# Patient Record
Sex: MALE | Race: WHITE | Employment: FULL TIME | URBAN - METROPOLITAN AREA
[De-identification: names, ages, dates, MRNs, and addresses within clinical notes are randomized per-mention and may not be internally consistent; named-entity substitution may affect disease eponyms.]

---

## 2018-06-30 ENCOUNTER — APPOINTMENT (OUTPATIENT)
Dept: CT IMAGING | Age: 23
End: 2018-06-30
Payer: COMMERCIAL

## 2018-06-30 ENCOUNTER — APPOINTMENT (OUTPATIENT)
Dept: GENERAL RADIOLOGY | Age: 23
End: 2018-06-30
Payer: COMMERCIAL

## 2018-06-30 ENCOUNTER — HOSPITAL ENCOUNTER (EMERGENCY)
Age: 23
Discharge: HOME | End: 2018-07-01
Attending: EMERGENCY MEDICINE
Payer: COMMERCIAL

## 2018-06-30 ENCOUNTER — APPOINTMENT (OUTPATIENT)
Dept: ULTRASOUND IMAGING | Age: 23
End: 2018-06-30
Payer: COMMERCIAL

## 2018-06-30 ENCOUNTER — NURSE TRIAGE (OUTPATIENT)
Dept: OTHER | Facility: CLINIC | Age: 23
End: 2018-06-30

## 2018-06-30 DIAGNOSIS — M54.30 SCIATICA, UNSPECIFIED LATERALITY: Primary | ICD-10-CM

## 2018-06-30 DIAGNOSIS — R55 NEAR SYNCOPE: ICD-10-CM

## 2018-06-30 LAB
ANION GAP SERPL CALCULATED.3IONS-SCNC: 12 MMOL/L (ref 7–16)
BUN BLDV-MCNC: 7 MG/DL (ref 6–20)
CALCIUM SERPL-MCNC: 8.4 MG/DL (ref 8.6–10.2)
CHLORIDE BLD-SCNC: 96 MMOL/L (ref 98–107)
CO2: 26 MMOL/L (ref 22–29)
CREAT SERPL-MCNC: 1 MG/DL (ref 0.7–1.2)
D DIMER: 2728 NG/ML DDU
EKG ATRIAL RATE: 100 BPM
EKG P AXIS: 37 DEGREES
EKG P-R INTERVAL: 128 MS
EKG Q-T INTERVAL: 348 MS
EKG QRS DURATION: 90 MS
EKG QTC CALCULATION (BAZETT): 448 MS
EKG R AXIS: 28 DEGREES
EKG T AXIS: 28 DEGREES
EKG VENTRICULAR RATE: 100 BPM
GFR AFRICAN AMERICAN: >60
GFR NON-AFRICAN AMERICAN: >60 ML/MIN/1.73
GLUCOSE BLD-MCNC: 98 MG/DL (ref 74–109)
HCT VFR BLD CALC: 43.9 % (ref 37–54)
HEMOGLOBIN: 14.8 G/DL (ref 12.5–16.5)
MCH RBC QN AUTO: 30.3 PG (ref 26–35)
MCHC RBC AUTO-ENTMCNC: 33.7 % (ref 32–34.5)
MCV RBC AUTO: 89.8 FL (ref 80–99.9)
PDW BLD-RTO: 12.8 FL (ref 11.5–15)
PLATELET # BLD: 188 E9/L (ref 130–450)
PMV BLD AUTO: 11.1 FL (ref 7–12)
POTASSIUM SERPL-SCNC: 3.3 MMOL/L (ref 3.5–5)
RBC # BLD: 4.89 E12/L (ref 3.8–5.8)
SODIUM BLD-SCNC: 134 MMOL/L (ref 132–146)
TROPONIN: <0.01 NG/ML (ref 0–0.03)
WBC # BLD: 10.7 E9/L (ref 4.5–11.5)

## 2018-06-30 PROCEDURE — 72100 X-RAY EXAM L-S SPINE 2/3 VWS: CPT

## 2018-06-30 PROCEDURE — 99284 EMERGENCY DEPT VISIT MOD MDM: CPT

## 2018-06-30 PROCEDURE — 93971 EXTREMITY STUDY: CPT

## 2018-06-30 PROCEDURE — 85378 FIBRIN DEGRADE SEMIQUANT: CPT

## 2018-06-30 PROCEDURE — 6360000004 HC RX CONTRAST MEDICATION: Performed by: RADIOLOGY

## 2018-06-30 PROCEDURE — 85027 COMPLETE CBC AUTOMATED: CPT

## 2018-06-30 PROCEDURE — 80048 BASIC METABOLIC PNL TOTAL CA: CPT

## 2018-06-30 PROCEDURE — 6360000002 HC RX W HCPCS: Performed by: PHYSICIAN ASSISTANT

## 2018-06-30 PROCEDURE — 2580000003 HC RX 258: Performed by: PHYSICIAN ASSISTANT

## 2018-06-30 PROCEDURE — 6370000000 HC RX 637 (ALT 250 FOR IP): Performed by: PHYSICIAN ASSISTANT

## 2018-06-30 PROCEDURE — 72040 X-RAY EXAM NECK SPINE 2-3 VW: CPT

## 2018-06-30 PROCEDURE — 94760 N-INVAS EAR/PLS OXIMETRY 1: CPT

## 2018-06-30 PROCEDURE — 36415 COLL VENOUS BLD VENIPUNCTURE: CPT

## 2018-06-30 PROCEDURE — 71260 CT THORAX DX C+: CPT

## 2018-06-30 PROCEDURE — 96372 THER/PROPH/DIAG INJ SC/IM: CPT

## 2018-06-30 PROCEDURE — 84484 ASSAY OF TROPONIN QUANT: CPT

## 2018-06-30 RX ORDER — DEXAMETHASONE SODIUM PHOSPHATE 10 MG/ML
10 INJECTION INTRAMUSCULAR; INTRAVENOUS ONCE
Status: COMPLETED | OUTPATIENT
Start: 2018-06-30 | End: 2018-06-30

## 2018-06-30 RX ORDER — KETOROLAC TROMETHAMINE 30 MG/ML
30 INJECTION, SOLUTION INTRAMUSCULAR; INTRAVENOUS ONCE
Status: COMPLETED | OUTPATIENT
Start: 2018-06-30 | End: 2018-06-30

## 2018-06-30 RX ORDER — PREDNISONE 10 MG/1
40 TABLET ORAL DAILY
Qty: 20 TABLET | Refills: 0 | Status: SHIPPED | OUTPATIENT
Start: 2018-06-30 | End: 2018-07-05

## 2018-06-30 RX ORDER — 0.9 % SODIUM CHLORIDE 0.9 %
1000 INTRAVENOUS SOLUTION INTRAVENOUS ONCE
Status: COMPLETED | OUTPATIENT
Start: 2018-06-30 | End: 2018-06-30

## 2018-06-30 RX ORDER — NAPROXEN 500 MG/1
500 TABLET ORAL 2 TIMES DAILY
Qty: 14 TABLET | Refills: 0 | Status: SHIPPED | OUTPATIENT
Start: 2018-06-30 | End: 2019-02-12

## 2018-06-30 RX ORDER — POTASSIUM CHLORIDE 20 MEQ/1
40 TABLET, EXTENDED RELEASE ORAL ONCE
Status: COMPLETED | OUTPATIENT
Start: 2018-06-30 | End: 2018-06-30

## 2018-06-30 RX ADMIN — POTASSIUM CHLORIDE 40 MEQ: 20 TABLET, EXTENDED RELEASE ORAL at 21:49

## 2018-06-30 RX ADMIN — IOPAMIDOL 70 ML: 755 INJECTION, SOLUTION INTRAVENOUS at 23:00

## 2018-06-30 RX ADMIN — DEXAMETHASONE SODIUM PHOSPHATE 10 MG: 10 INJECTION INTRAMUSCULAR; INTRAVENOUS at 20:14

## 2018-06-30 RX ADMIN — KETOROLAC TROMETHAMINE 30 MG: 30 INJECTION, SOLUTION INTRAMUSCULAR at 20:13

## 2018-06-30 RX ADMIN — SODIUM CHLORIDE 1000 ML: 9 INJECTION, SOLUTION INTRAVENOUS at 21:00

## 2018-06-30 ASSESSMENT — PAIN DESCRIPTION - DESCRIPTORS
DESCRIPTORS: SHARP;STABBING
DESCRIPTORS: DISCOMFORT;SHARP;STABBING

## 2018-06-30 ASSESSMENT — PAIN SCALES - GENERAL
PAINLEVEL_OUTOF10: 8

## 2018-06-30 ASSESSMENT — PAIN DESCRIPTION - FREQUENCY
FREQUENCY: CONTINUOUS
FREQUENCY: CONTINUOUS

## 2018-06-30 ASSESSMENT — PAIN DESCRIPTION - LOCATION
LOCATION: LEG
LOCATION: LEG

## 2018-06-30 ASSESSMENT — PAIN DESCRIPTION - ORIENTATION
ORIENTATION: LEFT
ORIENTATION: RIGHT

## 2018-06-30 ASSESSMENT — PAIN DESCRIPTION - PAIN TYPE
TYPE: ACUTE PAIN
TYPE: ACUTE PAIN

## 2018-06-30 NOTE — ED PROVIDER NOTES
ED Attending  CC: No  HPI:  6/30/18, Time: 7:36 PM         Josie Lee is a 20 y.o. male presenting to the ED for left hip / leg pain  beginning 3 days  ago. The complaint has been persistent, moderate in severity, and worsened by standing, sitting, walking. Patient comes in with complaint of left leg pain numbness. He also states he's had intermittent right arm pain with numbness. Improvement denies any known injury. He states he has had intermittent neck and lower back pain. Denies any abdominal pain no urinary or bowel incontinence no saddle paresthesias. States the pain started 3 days ago. Has concern for DVT. He states his family history of DVTs. Review of Systems:   Pertinent positives and negatives are stated within HPI, all other systems reviewed and are negative.          --------------------------------------------- PAST HISTORY ---------------------------------------------  Past Medical History:  has a past medical history of GERD (gastroesophageal reflux disease); Hyperlipidemia; Hypertension; Intracranial arachnoid cyst; and Tobacco abuse. Past Surgical History:  has a past surgical history that includes Tympanostomy tube placement. Social History:  reports that he has been smoking Cigarettes. He started smoking about 9 years ago. He has a 17.00 pack-year smoking history. He has never used smokeless tobacco. He reports that he drinks alcohol. He reports that he does not use drugs. Family History: family history includes ADHD in his brother; COPD in his maternal grandmother; Cancer in his maternal grandfather; Down Syndrome in his maternal aunt; High Blood Pressure in his maternal aunt, maternal aunt, and maternal grandmother; High Cholesterol in his maternal grandmother; Other in his father, maternal grandmother, paternal grandmother, and sister; Seizures in his paternal grandfather; Thyroid Disease in his paternal aunt and paternal uncle.      The patients home medications have mL (70 mLs Intravenous Given 6/30/18 2300)     EKG # 1  Interpreted by emergency department physician unless otherwise noted. Time:  2119    Rate: 100  Rhythm: Sinus. Interpretation: normal EKG, normal sinus rhythm. ED COURSE:   2030 Patient had a syncopal episode while getting x-rays. Patient denies any headache. Denied any chest pain palpitations shortness of breath. He did appear diaphoretic upon return to the ER. Patient denies any abdominal pain. Medical Decision Making: CT chest ultrasound lower extremity negative EKG normal sinus rhythm   Patient stated before on sciatica placed on prednisone Naprosyn to follow-up with primary care    Counseling: The emergency provider has spoken with the patient and discussed todays results, in addition to providing specific details for the plan of care and counseling regarding the diagnosis and prognosis. Questions are answered at this time and they are agreeable with the plan.      --------------------------------- IMPRESSION AND DISPOSITION ---------------------------------    IMPRESSION  1. Sciatica, unspecified laterality    2. Near syncope        DISPOSITION  Disposition: Discharge to home  Patient condition is good      NOTE: This report was transcribed using voice recognition software.  Every effort was made to ensure accuracy; however, inadvertent computerized transcription errors may be present     Camilla Wu, [id number]  07/01/18 [id number]

## 2018-07-01 VITALS
SYSTOLIC BLOOD PRESSURE: 105 MMHG | RESPIRATION RATE: 16 BRPM | WEIGHT: 200 LBS | HEART RATE: 88 BPM | DIASTOLIC BLOOD PRESSURE: 65 MMHG | TEMPERATURE: 98.6 F | OXYGEN SATURATION: 96 % | BODY MASS INDEX: 29.62 KG/M2 | HEIGHT: 69 IN

## 2018-07-02 ENCOUNTER — HOSPITAL ENCOUNTER (OUTPATIENT)
Age: 23
Discharge: HOME | End: 2018-07-04
Payer: COMMERCIAL

## 2018-07-02 ENCOUNTER — OFFICE VISIT (OUTPATIENT)
Dept: FAMILY MEDICINE CLINIC | Age: 23
End: 2018-07-02
Payer: COMMERCIAL

## 2018-07-02 VITALS
HEART RATE: 106 BPM | BODY MASS INDEX: 34.8 KG/M2 | TEMPERATURE: 98.1 F | DIASTOLIC BLOOD PRESSURE: 70 MMHG | OXYGEN SATURATION: 95 % | SYSTOLIC BLOOD PRESSURE: 90 MMHG | HEIGHT: 69 IN | RESPIRATION RATE: 18 BRPM | WEIGHT: 235 LBS

## 2018-07-02 DIAGNOSIS — R55 SYNCOPE, UNSPECIFIED SYNCOPE TYPE: Primary | ICD-10-CM

## 2018-07-02 DIAGNOSIS — R55 SYNCOPE, UNSPECIFIED SYNCOPE TYPE: ICD-10-CM

## 2018-07-02 DIAGNOSIS — E87.6 HYPOKALEMIA: ICD-10-CM

## 2018-07-02 DIAGNOSIS — G93.0 ARACHNOID CYST: ICD-10-CM

## 2018-07-02 LAB
ANION GAP SERPL CALCULATED.3IONS-SCNC: 13 MMOL/L (ref 7–16)
BUN BLDV-MCNC: 7 MG/DL (ref 6–20)
CALCIUM SERPL-MCNC: 8.2 MG/DL (ref 8.6–10.2)
CHLORIDE BLD-SCNC: 102 MMOL/L (ref 98–107)
CO2: 24 MMOL/L (ref 22–29)
CREAT SERPL-MCNC: 0.9 MG/DL (ref 0.7–1.2)
GFR AFRICAN AMERICAN: >60
GFR NON-AFRICAN AMERICAN: >60 ML/MIN/1.73
GLUCOSE BLD-MCNC: 85 MG/DL (ref 74–109)
POTASSIUM SERPL-SCNC: 3.9 MMOL/L (ref 3.5–5)
SODIUM BLD-SCNC: 139 MMOL/L (ref 132–146)

## 2018-07-02 PROCEDURE — 80048 BASIC METABOLIC PNL TOTAL CA: CPT

## 2018-07-02 PROCEDURE — 99214 OFFICE O/P EST MOD 30 MIN: CPT | Performed by: FAMILY MEDICINE

## 2018-07-02 PROCEDURE — 36415 COLL VENOUS BLD VENIPUNCTURE: CPT | Performed by: FAMILY MEDICINE

## 2018-07-02 ASSESSMENT — ENCOUNTER SYMPTOMS
SINUS PRESSURE: 0
WHEEZING: 0
COLOR CHANGE: 0
CHEST TIGHTNESS: 0
ALLERGIC/IMMUNOLOGIC NEGATIVE: 1
SHORTNESS OF BREATH: 0
SORE THROAT: 0
COUGH: 0
DIARRHEA: 0
APNEA: 0
NAUSEA: 0
BLOOD IN STOOL: 0
VOMITING: 0
PHOTOPHOBIA: 0
FACIAL SWELLING: 0

## 2018-07-02 NOTE — PROGRESS NOTES
DO    Hypokalemia  -     BASIC METABOLIC PANEL;  Future    Arachnoid cyst  -     CT Head WO Contrast; Future            Marzena Kilgore DO    7/2/2018  11:43 AM

## 2018-07-09 ENCOUNTER — HOSPITAL ENCOUNTER (OUTPATIENT)
Dept: CT IMAGING | Age: 23
Discharge: HOME | End: 2018-07-11
Payer: COMMERCIAL

## 2018-07-09 DIAGNOSIS — R55 SYNCOPE, UNSPECIFIED SYNCOPE TYPE: ICD-10-CM

## 2018-07-09 DIAGNOSIS — G93.0 ARACHNOID CYST: ICD-10-CM

## 2018-07-09 PROCEDURE — 70450 CT HEAD/BRAIN W/O DYE: CPT

## 2018-07-19 ENCOUNTER — TELEPHONE (OUTPATIENT)
Dept: FAMILY MEDICINE CLINIC | Age: 23
End: 2018-07-19

## 2018-07-19 DIAGNOSIS — F41.8 ANXIETY WITH DEPRESSION: Primary | ICD-10-CM

## 2019-02-12 ENCOUNTER — OFFICE VISIT (OUTPATIENT)
Dept: FAMILY MEDICINE CLINIC | Age: 24
End: 2019-02-12
Payer: COMMERCIAL

## 2019-02-12 ENCOUNTER — HOSPITAL ENCOUNTER (OUTPATIENT)
Age: 24
Discharge: HOME | End: 2019-02-14
Payer: COMMERCIAL

## 2019-02-12 VITALS
HEIGHT: 69 IN | SYSTOLIC BLOOD PRESSURE: 130 MMHG | BODY MASS INDEX: 37.03 KG/M2 | OXYGEN SATURATION: 97 % | HEART RATE: 70 BPM | RESPIRATION RATE: 18 BRPM | DIASTOLIC BLOOD PRESSURE: 88 MMHG | TEMPERATURE: 98.2 F | WEIGHT: 250 LBS

## 2019-02-12 DIAGNOSIS — R10.13 EPIGASTRIC PAIN: ICD-10-CM

## 2019-02-12 DIAGNOSIS — Z23 NEED FOR PROPHYLACTIC VACCINATION AGAINST STREPTOCOCCUS PNEUMONIAE (PNEUMOCOCCUS): ICD-10-CM

## 2019-02-12 DIAGNOSIS — Z00.00 WELL ADULT EXAM: Primary | ICD-10-CM

## 2019-02-12 LAB
ALBUMIN SERPL-MCNC: 4.4 G/DL (ref 3.5–5.2)
ALP BLD-CCNC: 54 U/L (ref 40–129)
ALT SERPL-CCNC: 33 U/L (ref 0–40)
AMYLASE: 17 U/L (ref 20–100)
ANION GAP SERPL CALCULATED.3IONS-SCNC: 11 MMOL/L (ref 7–16)
AST SERPL-CCNC: 17 U/L (ref 0–39)
BASOPHILS ABSOLUTE: 0.03 E9/L (ref 0–0.2)
BASOPHILS RELATIVE PERCENT: 0.5 % (ref 0–2)
BILIRUB SERPL-MCNC: 0.4 MG/DL (ref 0–1.2)
BUN BLDV-MCNC: 9 MG/DL (ref 6–20)
CALCIUM SERPL-MCNC: 9.1 MG/DL (ref 8.6–10.2)
CHLORIDE BLD-SCNC: 103 MMOL/L (ref 98–107)
CO2: 25 MMOL/L (ref 22–29)
CREAT SERPL-MCNC: 0.9 MG/DL (ref 0.7–1.2)
EOSINOPHILS ABSOLUTE: 0.21 E9/L (ref 0.05–0.5)
EOSINOPHILS RELATIVE PERCENT: 3.2 % (ref 0–6)
GFR AFRICAN AMERICAN: >60
GFR NON-AFRICAN AMERICAN: >60 ML/MIN/1.73
GLUCOSE BLD-MCNC: 88 MG/DL (ref 74–99)
HCT VFR BLD CALC: 48.2 % (ref 37–54)
HEMOGLOBIN: 15.8 G/DL (ref 12.5–16.5)
IMMATURE GRANULOCYTES #: 0.02 E9/L
IMMATURE GRANULOCYTES %: 0.3 % (ref 0–5)
LIPASE: 23 U/L (ref 13–60)
LYMPHOCYTES ABSOLUTE: 2.05 E9/L (ref 1.5–4)
LYMPHOCYTES RELATIVE PERCENT: 31.6 % (ref 20–42)
MCH RBC QN AUTO: 30.8 PG (ref 26–35)
MCHC RBC AUTO-ENTMCNC: 32.8 % (ref 32–34.5)
MCV RBC AUTO: 94 FL (ref 80–99.9)
MONOCYTES ABSOLUTE: 0.53 E9/L (ref 0.1–0.95)
MONOCYTES RELATIVE PERCENT: 8.2 % (ref 2–12)
NEUTROPHILS ABSOLUTE: 3.65 E9/L (ref 1.8–7.3)
NEUTROPHILS RELATIVE PERCENT: 56.2 % (ref 43–80)
PDW BLD-RTO: 12.1 FL (ref 11.5–15)
PLATELET # BLD: 278 E9/L (ref 130–450)
PMV BLD AUTO: 11.2 FL (ref 7–12)
POTASSIUM SERPL-SCNC: 4.3 MMOL/L (ref 3.5–5)
RBC # BLD: 5.13 E12/L (ref 3.8–5.8)
SODIUM BLD-SCNC: 139 MMOL/L (ref 132–146)
TOTAL PROTEIN: 7.2 G/DL (ref 6.4–8.3)
WBC # BLD: 6.5 E9/L (ref 4.5–11.5)

## 2019-02-12 PROCEDURE — 86677 HELICOBACTER PYLORI ANTIBODY: CPT

## 2019-02-12 PROCEDURE — 99395 PREV VISIT EST AGE 18-39: CPT | Performed by: FAMILY MEDICINE

## 2019-02-12 PROCEDURE — 83690 ASSAY OF LIPASE: CPT

## 2019-02-12 PROCEDURE — 82150 ASSAY OF AMYLASE: CPT

## 2019-02-12 PROCEDURE — 80053 COMPREHEN METABOLIC PANEL: CPT

## 2019-02-12 PROCEDURE — 90732 PPSV23 VACC 2 YRS+ SUBQ/IM: CPT | Performed by: FAMILY MEDICINE

## 2019-02-12 PROCEDURE — 85025 COMPLETE CBC W/AUTO DIFF WBC: CPT

## 2019-02-12 PROCEDURE — 90471 IMMUNIZATION ADMIN: CPT | Performed by: FAMILY MEDICINE

## 2019-02-12 PROCEDURE — 36415 COLL VENOUS BLD VENIPUNCTURE: CPT | Performed by: FAMILY MEDICINE

## 2019-02-12 RX ORDER — PANTOPRAZOLE SODIUM 40 MG/1
40 TABLET, DELAYED RELEASE ORAL
Qty: 30 TABLET | Refills: 5 | Status: SHIPPED | OUTPATIENT
Start: 2019-02-12 | End: 2020-01-01

## 2019-02-12 ASSESSMENT — PATIENT HEALTH QUESTIONNAIRE - PHQ9
SUM OF ALL RESPONSES TO PHQ QUESTIONS 1-9: 2
SUM OF ALL RESPONSES TO PHQ QUESTIONS 1-9: 2
1. LITTLE INTEREST OR PLEASURE IN DOING THINGS: 0
2. FEELING DOWN, DEPRESSED OR HOPELESS: 2
SUM OF ALL RESPONSES TO PHQ9 QUESTIONS 1 & 2: 2

## 2019-02-12 ASSESSMENT — ENCOUNTER SYMPTOMS
SHORTNESS OF BREATH: 0
PHOTOPHOBIA: 0
VOMITING: 0
APNEA: 0
WHEEZING: 0
BLOOD IN STOOL: 0
COLOR CHANGE: 0
ABDOMINAL PAIN: 1
ALLERGIC/IMMUNOLOGIC NEGATIVE: 1
SINUS PRESSURE: 0
CHEST TIGHTNESS: 0
COUGH: 0
SORE THROAT: 0
BACK PAIN: 0
FACIAL SWELLING: 0
NAUSEA: 0
DIARRHEA: 0

## 2019-02-14 ENCOUNTER — TELEPHONE (OUTPATIENT)
Dept: SURGERY | Age: 24
End: 2019-02-14

## 2019-02-14 LAB — HELICOBACTER PYLORI IGG: NORMAL

## 2019-02-20 ENCOUNTER — TELEPHONE (OUTPATIENT)
Dept: SURGERY | Age: 24
End: 2019-02-20

## 2019-03-04 ENCOUNTER — TELEPHONE (OUTPATIENT)
Dept: SURGERY | Age: 24
End: 2019-03-04

## 2019-03-11 ENCOUNTER — TELEPHONE (OUTPATIENT)
Dept: ADMINISTRATIVE | Age: 24
End: 2019-03-11

## 2019-03-20 ENCOUNTER — OFFICE VISIT (OUTPATIENT)
Dept: SURGERY | Age: 24
End: 2019-03-20
Payer: COMMERCIAL

## 2019-03-20 VITALS
RESPIRATION RATE: 16 BRPM | HEART RATE: 109 BPM | DIASTOLIC BLOOD PRESSURE: 83 MMHG | BODY MASS INDEX: 37.89 KG/M2 | SYSTOLIC BLOOD PRESSURE: 132 MMHG | HEIGHT: 68 IN | WEIGHT: 250 LBS

## 2019-03-20 DIAGNOSIS — R10.11 RUQ PAIN: Primary | ICD-10-CM

## 2019-03-20 DIAGNOSIS — R10.13 EPIGASTRIC PAIN: ICD-10-CM

## 2019-03-20 DIAGNOSIS — R11.2 NAUSEA AND VOMITING, INTRACTABILITY OF VOMITING NOT SPECIFIED, UNSPECIFIED VOMITING TYPE: ICD-10-CM

## 2019-03-20 PROCEDURE — 99244 OFF/OP CNSLTJ NEW/EST MOD 40: CPT | Performed by: SURGERY

## 2019-03-23 ENCOUNTER — PREP FOR PROCEDURE (OUTPATIENT)
Dept: SURGERY | Age: 24
End: 2019-03-23

## 2019-03-23 RX ORDER — 0.9 % SODIUM CHLORIDE 0.9 %
10 VIAL (ML) INJECTION EVERY 12 HOURS SCHEDULED
Status: CANCELLED | OUTPATIENT
Start: 2019-03-23

## 2019-03-23 RX ORDER — SODIUM CHLORIDE 9 MG/ML
INJECTION, SOLUTION INTRAVENOUS CONTINUOUS
Status: CANCELLED | OUTPATIENT
Start: 2019-03-23

## 2019-03-23 RX ORDER — 0.9 % SODIUM CHLORIDE 0.9 %
10 VIAL (ML) INJECTION PRN
Status: CANCELLED | OUTPATIENT
Start: 2019-03-23

## 2019-03-23 ASSESSMENT — ENCOUNTER SYMPTOMS
DIARRHEA: 0
NAUSEA: 1
VOMITING: 1
SHORTNESS OF BREATH: 0
WHEEZING: 0
COUGH: 0
BACK PAIN: 0
CONSTIPATION: 0
PHOTOPHOBIA: 0
EYE REDNESS: 0
ABDOMINAL PAIN: 1
SORE THROAT: 0
BLOOD IN STOOL: 0

## 2019-03-25 ENCOUNTER — ANESTHESIA (OUTPATIENT)
Dept: ENDOSCOPY | Age: 24
End: 2019-03-25
Payer: COMMERCIAL

## 2019-03-25 ENCOUNTER — HOSPITAL ENCOUNTER (OUTPATIENT)
Age: 24
Setting detail: OUTPATIENT SURGERY
Discharge: HOME | End: 2019-03-25
Attending: SURGERY | Admitting: SURGERY
Payer: COMMERCIAL

## 2019-03-25 ENCOUNTER — ANESTHESIA EVENT (OUTPATIENT)
Dept: ENDOSCOPY | Age: 24
End: 2019-03-25
Payer: COMMERCIAL

## 2019-03-25 VITALS
HEIGHT: 69 IN | SYSTOLIC BLOOD PRESSURE: 129 MMHG | DIASTOLIC BLOOD PRESSURE: 70 MMHG | HEART RATE: 72 BPM | RESPIRATION RATE: 18 BRPM | OXYGEN SATURATION: 96 % | TEMPERATURE: 98 F | BODY MASS INDEX: 37.03 KG/M2 | WEIGHT: 250 LBS

## 2019-03-25 VITALS
RESPIRATION RATE: 16 BRPM | SYSTOLIC BLOOD PRESSURE: 117 MMHG | OXYGEN SATURATION: 95 % | DIASTOLIC BLOOD PRESSURE: 58 MMHG

## 2019-03-25 PROCEDURE — 88305 TISSUE EXAM BY PATHOLOGIST: CPT

## 2019-03-25 PROCEDURE — 7100000010 HC PHASE II RECOVERY - FIRST 15 MIN: Performed by: SURGERY

## 2019-03-25 PROCEDURE — 2580000003 HC RX 258: Performed by: SURGERY

## 2019-03-25 PROCEDURE — 6360000002 HC RX W HCPCS: Performed by: NURSE ANESTHETIST, CERTIFIED REGISTERED

## 2019-03-25 PROCEDURE — 3609012400 HC EGD TRANSORAL BIOPSY SINGLE/MULTIPLE: Performed by: SURGERY

## 2019-03-25 PROCEDURE — 2709999900 HC NON-CHARGEABLE SUPPLY: Performed by: SURGERY

## 2019-03-25 PROCEDURE — 7100000011 HC PHASE II RECOVERY - ADDTL 15 MIN: Performed by: SURGERY

## 2019-03-25 PROCEDURE — 3700000000 HC ANESTHESIA ATTENDED CARE: Performed by: SURGERY

## 2019-03-25 PROCEDURE — 43239 EGD BIOPSY SINGLE/MULTIPLE: CPT | Performed by: SURGERY

## 2019-03-25 PROCEDURE — 88342 IMHCHEM/IMCYTCHM 1ST ANTB: CPT

## 2019-03-25 RX ORDER — SODIUM CHLORIDE 0.9 % (FLUSH) 0.9 %
10 SYRINGE (ML) INJECTION PRN
Status: DISCONTINUED | OUTPATIENT
Start: 2019-03-25 | End: 2019-03-25 | Stop reason: HOSPADM

## 2019-03-25 RX ORDER — PROPOFOL 10 MG/ML
INJECTION, EMULSION INTRAVENOUS PRN
Status: DISCONTINUED | OUTPATIENT
Start: 2019-03-25 | End: 2019-03-25 | Stop reason: SDUPTHER

## 2019-03-25 RX ORDER — SODIUM CHLORIDE 0.9 % (FLUSH) 0.9 %
10 SYRINGE (ML) INJECTION EVERY 12 HOURS SCHEDULED
Status: DISCONTINUED | OUTPATIENT
Start: 2019-03-25 | End: 2019-03-25 | Stop reason: HOSPADM

## 2019-03-25 RX ORDER — SODIUM CHLORIDE 9 MG/ML
INJECTION, SOLUTION INTRAVENOUS CONTINUOUS
Status: DISCONTINUED | OUTPATIENT
Start: 2019-03-25 | End: 2019-03-25 | Stop reason: HOSPADM

## 2019-03-25 RX ADMIN — SODIUM CHLORIDE: 9 INJECTION, SOLUTION INTRAVENOUS at 12:14

## 2019-03-25 RX ADMIN — PROPOFOL 150 MG: 10 INJECTION, EMULSION INTRAVENOUS at 12:12

## 2019-03-25 RX ADMIN — SODIUM CHLORIDE: 9 INJECTION, SOLUTION INTRAVENOUS at 12:02

## 2019-03-25 ASSESSMENT — LIFESTYLE VARIABLES: SMOKING_STATUS: 1

## 2019-03-25 ASSESSMENT — ENCOUNTER SYMPTOMS: SHORTNESS OF BREATH: 0

## 2019-03-25 ASSESSMENT — PAIN - FUNCTIONAL ASSESSMENT: PAIN_FUNCTIONAL_ASSESSMENT: 0-10

## 2019-04-03 ENCOUNTER — HOSPITAL ENCOUNTER (OUTPATIENT)
Dept: NUCLEAR MEDICINE | Age: 24
Discharge: HOME | End: 2019-04-03
Payer: COMMERCIAL

## 2019-04-03 ENCOUNTER — HOSPITAL ENCOUNTER (OUTPATIENT)
Dept: ULTRASOUND IMAGING | Age: 24
Discharge: HOME | End: 2019-04-05
Payer: COMMERCIAL

## 2019-04-03 DIAGNOSIS — R10.11 RUQ PAIN: ICD-10-CM

## 2019-04-03 PROCEDURE — 76705 ECHO EXAM OF ABDOMEN: CPT

## 2019-04-10 ENCOUNTER — TELEPHONE (OUTPATIENT)
Dept: SURGERY | Age: 24
End: 2019-04-10

## 2019-04-10 ENCOUNTER — OFFICE VISIT (OUTPATIENT)
Dept: SURGERY | Age: 24
End: 2019-04-10
Payer: COMMERCIAL

## 2019-04-10 ENCOUNTER — PREP FOR PROCEDURE (OUTPATIENT)
Dept: SURGERY | Age: 24
End: 2019-04-10

## 2019-04-10 VITALS
HEART RATE: 95 BPM | HEIGHT: 68 IN | DIASTOLIC BLOOD PRESSURE: 79 MMHG | SYSTOLIC BLOOD PRESSURE: 133 MMHG | TEMPERATURE: 97.6 F | RESPIRATION RATE: 20 BRPM | OXYGEN SATURATION: 94 % | BODY MASS INDEX: 36.98 KG/M2 | WEIGHT: 244 LBS

## 2019-04-10 DIAGNOSIS — K80.20 SYMPTOMATIC CHOLELITHIASIS: ICD-10-CM

## 2019-04-10 DIAGNOSIS — R10.11 RUQ PAIN: Primary | ICD-10-CM

## 2019-04-10 DIAGNOSIS — R10.13 EPIGASTRIC PAIN: ICD-10-CM

## 2019-04-10 DIAGNOSIS — R11.2 NAUSEA AND VOMITING, INTRACTABILITY OF VOMITING NOT SPECIFIED, UNSPECIFIED VOMITING TYPE: ICD-10-CM

## 2019-04-10 PROCEDURE — 99214 OFFICE O/P EST MOD 30 MIN: CPT | Performed by: SURGERY

## 2019-04-10 RX ORDER — SODIUM CHLORIDE 0.9 % (FLUSH) 0.9 %
10 SYRINGE (ML) INJECTION PRN
Status: CANCELLED | OUTPATIENT
Start: 2019-04-10

## 2019-04-10 RX ORDER — SODIUM CHLORIDE 9 MG/ML
INJECTION, SOLUTION INTRAVENOUS CONTINUOUS
Status: CANCELLED | OUTPATIENT
Start: 2019-04-10

## 2019-04-10 RX ORDER — SODIUM CHLORIDE 0.9 % (FLUSH) 0.9 %
10 SYRINGE (ML) INJECTION EVERY 12 HOURS SCHEDULED
Status: CANCELLED | OUTPATIENT
Start: 2019-04-10

## 2019-04-10 ASSESSMENT — ENCOUNTER SYMPTOMS
CONSTIPATION: 0
BACK PAIN: 0
ABDOMINAL PAIN: 1
NAUSEA: 1
WHEEZING: 0
BLOOD IN STOOL: 0
PHOTOPHOBIA: 0
DIARRHEA: 0
COUGH: 0
EYE REDNESS: 0
SHORTNESS OF BREATH: 0
VOMITING: 1
SORE THROAT: 0

## 2019-04-10 NOTE — TELEPHONE ENCOUNTER
Scheduled patient for lap talita on 4/19/19 at 10:00am. Patient needs to report at the front entrance 2 hours before the procedure, NPO after the midnight the night before the procedure. No ASA products for 5 days. Attempted to call the patient, no answer, left message on the phone. Instruction letter mailed. Encouraged to call our office if any questions.   Electronically signed by Kiki Salamanca on 4/10/2019 at 1:47 PM

## 2019-04-10 NOTE — PROGRESS NOTES
History and Physical    Patient's Name/Date of Birth: Pasha Moore / 1995 (30 y.o.)    Date: April 10, 2019     Chief Complaint:   Chief Complaint   Patient presents with    Other     US review gallbladder        HPI: Patient is a 78-year-old male who presents for follow-up after recent EGD with biopsy. His main symptoms were nausea vomiting and right upper quadrant and epigastric abdominal pain. He is still experiencing symptoms. On EGD to have moderate gastritis but no ulcers. He is currently taking PPIs. He completed his gallbladder ultrasound which did reveal a large amount of stones. Past Medical History:   Diagnosis Date    GERD (gastroesophageal reflux disease)     Hyperlipidemia     was on medication in past, age 10 on zocor    Hypertension     never on meds, dxed by last PCP    Intracranial arachnoid cyst 5/17/2016    per CT 5/17/16-Left posterior cranial fossa    Tobacco abuse        Past Surgical History:   Procedure Laterality Date    TYMPANOSTOMY TUBE PLACEMENT      UPPER GASTROINTESTINAL ENDOSCOPY N/A 3/25/2019    EGD BIOPSY performed by Jens Pal DO at [address]       Prior to Admission medications    Medication Sig Start Date End Date Taking?  Authorizing Provider   pantoprazole (PROTONIX) 40 MG tablet Take 1 tablet by mouth every morning (before breakfast) 2/12/19   Alex Abbasi DO       No Known Allergies    Family History   Problem Relation Age of Onset    Other Father         motorcycle accident    Other Sister     ADHD Brother     Down Syndrome Maternal Aunt     Thyroid Disease Paternal Aunt     Thyroid Disease Paternal Uncle     Other Maternal Grandmother         Pulmonary Emboli    High Blood Pressure Maternal Grandmother     High Cholesterol Maternal Grandmother     COPD Maternal Grandmother     Cancer Maternal Grandfather         prostate    Other Paternal Grandmother         blood clots in legs    Seizures Paternal Grandfather     High Blood Pressure Maternal Aunt     High Blood Pressure Maternal Aunt           Social History     Socioeconomic History    Marital status: Single     Spouse name: Not on file    Number of children: Not on file    Years of education: Not on file    Highest education level: Not on file   Occupational History    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[place] [facility] care in [facility] and [facility] home health care   Social Needs    Financial resource strain: Not on file    Food insecurity:     Worry: Not on file     Inability: Not on file    Transportation needs:     Medical: Not on file     Non-medical: Not on file   Tobacco Use    Smoking status: Current Every Day Smoker     Packs/day: 1.00     Years: 17.00     Pack years: 17.00     Types: Cigarettes     Start date: 1/1/2009    Smokeless tobacco: Never Used    Tobacco comment: started age 10 and smoked up to a pack a day   Substance and Sexual Activity    Alcohol use: Yes     Alcohol/week: 0.0 oz     Comment: Occasionally-socially    Drug use: No    Sexual activity: Yes     Partners: Male     Comment: Identifies as carranza   Lifestyle    Physical activity:     Days per week: Not on file     Minutes per session: Not on file    Stress: Not on file   Relationships    Social connections:     Talks on phone: Not on file     Gets together: Not on file     Attends Yazidism service: Not on file     Active member of club or organization: Not on file     Attends meetings of clubs or organizations: Not on file     Relationship status: Not on file    Intimate partner violence:     Fear of current or ex partner: Not on file     Emotionally abused: Not on file     Physically abused: Not on file     Forced sexual activity: Not on file   Other Topics Concern    Not on file   Social History Narrative    Lives alone       Review of Systems:   Review of Systems   Constitutional: Negative for chills and fever. HENT: Negative for ear pain, nosebleeds, sore throat and tinnitus. Eyes: Negative for photophobia and redness. Respiratory: Negative for cough, shortness of breath and wheezing. Cardiovascular: Negative for chest pain and palpitations. Gastrointestinal: Positive for abdominal pain, nausea and vomiting. Negative for blood in stool, constipation and diarrhea. Endocrine: Negative for polydipsia. Genitourinary: Negative for dysuria, hematuria and urgency. Musculoskeletal: Negative for back pain and neck pain. Skin: Negative for rash. Neurological: Negative for dizziness, tremors and seizures. Hematological: Does not bruise/bleed easily. All other systems reviewed and are negative. Physical Exam:  Vitals:    04/10/19 1238   BP: 133/79   Pulse: 95   Resp: 20   Temp: 97.6 °F (36.4 °C)   TempSrc: Oral   SpO2: 94%   Weight: 244 lb (110.7 kg)   Height: 5' 8\" (1.727 m)       General: Well nourished, well developed, no acute distress  Eyes:  PERRL   Conjunctiva unremarkable   ENT:  TM's intact bilaterally, no effusion   Nasal:  Nomucosal edema     No nasal drainage   Oral:  mucosa moist and pink   Neck:  Supple   No palpable cervical lymphoadenopathy   Thyroid without mass or enlargement  Resp: Lungs CTAB   Equal and adequate air exchange without accessory muscle use   No rales, rhonchi or wheeze  CV: S1S2 RRR   No murmur   Intact distal pulses   No edema  GI: Abdomen Soft, nontender, non distended   Normoactive bowel sounds   No palpable hepatosplenomegaly  MS: Physiologic ROM of all extremities    Intact distal pulses   No clubbing or cyanosis   Skin Warmand dry; no rash or lesion  Neuro: Alert and oriented; normal and intact dtr's   Psych: Euthymic mood, congruent affect      Us Gallbladder Ruq    Result Date: 4/3/2019  Patient MRN: [id number] : 1995 Age:  30 years Gender: Male Order Date: 4/3/2019 9:31 AM Exam: US GALLBLADDER RUQ Number of Images: 24 views Indication:  R10.11 RUQ pain ruq pain Comparison: None.  Findings: Real-time sonogram study of the gallbladder reveals a normal size gallbladder. There are multiple gallstones seen in the dependent portion of the gallbladder lumen. . Common bile duct and intrahepatic ducts are normal. The common bile duct measures 0.23 cm and the gallbladder wall thickness is 0.20 cm. . No ascites is identified. The liver appears to be normal.     Cholelithiasis           Assessment/Plan:  3 78-year-old male with symptomatic cholelithiasis, abdominal pain, nausea and vomiting. We shall plan for cholecystectomy. The risks of the procedure were explained to the patient including but not limited to bleeding, infection, bile leak, bile duct injury, and the need for additional surgical procedures. The patient expresses understanding and consents to proceed with the surgery.         Electronically signed by Jens Pal DO on 4/10/19 at 2:22 PM

## 2019-04-10 NOTE — H&P (VIEW-ONLY)
History and Physical    Patient's Name/Date of Birth: Ursula Kerr / 1995 (30 y.o.)    Date: April 10, 2019     Chief Complaint:   Chief Complaint   Patient presents with    Other     US review gallbladder        HPI: Patient is a 58-year-old male who presents for follow-up after recent EGD with biopsy. His main symptoms were nausea vomiting and right upper quadrant and epigastric abdominal pain. He is still experiencing symptoms. On EGD to have moderate gastritis but no ulcers. He is currently taking PPIs. He completed his gallbladder ultrasound which did reveal a large amount of stones. Past Medical History:   Diagnosis Date    GERD (gastroesophageal reflux disease)     Hyperlipidemia     was on medication in past, age 10 on zocor    Hypertension     never on meds, dxed by last PCP    Intracranial arachnoid cyst 5/17/2016    per CT 5/17/16-Left posterior cranial fossa    Tobacco abuse        Past Surgical History:   Procedure Laterality Date    TYMPANOSTOMY TUBE PLACEMENT      UPPER GASTROINTESTINAL ENDOSCOPY N/A 3/25/2019    EGD BIOPSY performed by Azar Hannon DO at [address]       Prior to Admission medications    Medication Sig Start Date End Date Taking?  Authorizing Provider   pantoprazole (PROTONIX) 40 MG tablet Take 1 tablet by mouth every morning (before breakfast) 2/12/19   Shruthi Mendez DO       No Known Allergies    Family History   Problem Relation Age of Onset    Other Father         motorcycle accident    Other Sister     ADHD Brother     Down Syndrome Maternal Aunt     Thyroid Disease Paternal Aunt     Thyroid Disease Paternal Uncle     Other Maternal Grandmother         Pulmonary Emboli    High Blood Pressure Maternal Grandmother     High Cholesterol Maternal Grandmother     COPD Maternal Grandmother     Cancer Maternal Grandfather         prostate    Other Paternal Grandmother         blood clots in legs    Seizures Paternal Grandfather     High Blood Eyes: Negative for photophobia and redness. Respiratory: Negative for cough, shortness of breath and wheezing. Cardiovascular: Negative for chest pain and palpitations. Gastrointestinal: Positive for abdominal pain, nausea and vomiting. Negative for blood in stool, constipation and diarrhea. Endocrine: Negative for polydipsia. Genitourinary: Negative for dysuria, hematuria and urgency. Musculoskeletal: Negative for back pain and neck pain. Skin: Negative for rash. Neurological: Negative for dizziness, tremors and seizures. Hematological: Does not bruise/bleed easily. All other systems reviewed and are negative. Physical Exam:  Vitals:    04/10/19 1238   BP: 133/79   Pulse: 95   Resp: 20   Temp: 97.6 °F (36.4 °C)   TempSrc: Oral   SpO2: 94%   Weight: 244 lb (110.7 kg)   Height: 5' 8\" (1.727 m)       General: Well nourished, well developed, no acute distress  Eyes:  PERRL   Conjunctiva unremarkable   ENT:  TM's intact bilaterally, no effusion   Nasal:  Nomucosal edema     No nasal drainage   Oral:  mucosa moist and pink   Neck:  Supple   No palpable cervical lymphoadenopathy   Thyroid without mass or enlargement  Resp: Lungs CTAB   Equal and adequate air exchange without accessory muscle use   No rales, rhonchi or wheeze  CV: S1S2 RRR   No murmur   Intact distal pulses   No edema  GI: Abdomen Soft, nontender, non distended   Normoactive bowel sounds   No palpable hepatosplenomegaly  MS: Physiologic ROM of all extremities    Intact distal pulses   No clubbing or cyanosis   Skin Warmand dry; no rash or lesion  Neuro: Alert and oriented; normal and intact dtr's   Psych: Euthymic mood, congruent affect      Us Gallbladder Ruq    Result Date: 4/3/2019  Patient MRN: [id number] : 1995 Age:  30 years Gender: Male Order Date: 4/3/2019 9:31 AM Exam: US GALLBLADDER RUQ Number of Images: 24 views Indication:  R10.11 RUQ pain ruq pain Comparison: None.  Findings: Real-time sonogram study of the gallbladder reveals a normal size gallbladder. There are multiple gallstones seen in the dependent portion of the gallbladder lumen. . Common bile duct and intrahepatic ducts are normal. The common bile duct measures 0.23 cm and the gallbladder wall thickness is 0.20 cm. . No ascites is identified. The liver appears to be normal.     Cholelithiasis           Assessment/Plan:  3 43-year-old male with symptomatic cholelithiasis, abdominal pain, nausea and vomiting. We shall plan for cholecystectomy. The risks of the procedure were explained to the patient including but not limited to bleeding, infection, bile leak, bile duct injury, and the need for additional surgical procedures. The patient expresses understanding and consents to proceed with the surgery.

## 2019-04-10 NOTE — H&P
History and Physical    Patient's Name/Date of Birth: Wilbert Cruz / 1995 (30 y.o.)    Date: April 10, 2019     Chief Complaint:   Chief Complaint   Patient presents with    Other     US review gallbladder        HPI: Patient is a 42-year-old male who presents for follow-up after recent EGD with biopsy. His main symptoms were nausea vomiting and right upper quadrant and epigastric abdominal pain. He is still experiencing symptoms. On EGD to have moderate gastritis but no ulcers. He is currently taking PPIs. He completed his gallbladder ultrasound which did reveal a large amount of stones. Past Medical History:   Diagnosis Date    GERD (gastroesophageal reflux disease)     Hyperlipidemia     was on medication in past, age 10 on zocor    Hypertension     never on meds, dxed by last PCP    Intracranial arachnoid cyst 5/17/2016    per CT 5/17/16-Left posterior cranial fossa    Tobacco abuse        Past Surgical History:   Procedure Laterality Date    TYMPANOSTOMY TUBE PLACEMENT      UPPER GASTROINTESTINAL ENDOSCOPY N/A 3/25/2019    EGD BIOPSY performed by Yas Hernandez DO at [address] [place] [address]       Prior to Admission medications    Medication Sig Start Date End Date Taking?  Authorizing Provider   pantoprazole (PROTONIX) 40 MG tablet Take 1 tablet by mouth every morning (before breakfast) 2/12/19   Zach Mccoy DO       No Known Allergies    Family History   Problem Relation Age of Onset    Other Father         motorcycle accident    Other Sister     ADHD Brother     Down Syndrome Maternal Aunt     Thyroid Disease Paternal Aunt     Thyroid Disease Paternal Uncle     Other Maternal Grandmother         Pulmonary Emboli    High Blood Pressure Maternal Grandmother     High Cholesterol Maternal Grandmother     COPD Maternal Grandmother     Cancer Maternal Grandfather         prostate    Other Paternal Grandmother         blood clots in legs    Seizures Paternal Grandfather     High Blood Pressure Maternal Aunt     High Blood Pressure Maternal Aunt           Social History     Socioeconomic History    Marital status: Single     Spouse name: Not on file    Number of children: Not on file    Years of education: Not on file    Highest education level: Not on file   Occupational History    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 and care for [facility] home health care   Social Needs    Financial resource strain: Not on file    Food insecurity:     Worry: Not on file     Inability: Not on file    Transportation needs:     Medical: Not on file     Non-medical: Not on file   Tobacco Use    Smoking status: Current Every Day Smoker     Packs/day: 1.00     Years: 17.00     Pack years: 17.00     Types: Cigarettes     Start date: 1/1/2009    Smokeless tobacco: Never Used    Tobacco comment: started age 10 and smoked up to a pack a day   Substance and Sexual Activity    Alcohol use: Yes     Alcohol/week: 0.0 oz     Comment: Occasionally-socially    Drug use: No    Sexual activity: Yes     Partners: Male     Comment: Identifies as carranza   Lifestyle    Physical activity:     Days per week: Not on file     Minutes per session: Not on file    Stress: Not on file   Relationships    Social connections:     Talks on phone: Not on file     Gets together: Not on file     Attends Hindu service: Not on file     Active member of club or organization: Not on file     Attends meetings of clubs or organizations: Not on file     Relationship status: Not on file    Intimate partner violence:     Fear of current or ex partner: Not on file     Emotionally abused: Not on file     Physically abused: Not on file     Forced sexual activity: Not on file   Other Topics Concern    Not on file   Social History Narrative    Lives alone       Review of Systems:   Review of Systems   Constitutional: Negative for chills and fever. HENT: Negative for ear pain, nosebleeds, sore throat and tinnitus. Eyes: Negative for photophobia and redness. Respiratory: Negative for cough, shortness of breath and wheezing. Cardiovascular: Negative for chest pain and palpitations. Gastrointestinal: Positive for abdominal pain, nausea and vomiting. Negative for blood in stool, constipation and diarrhea. Endocrine: Negative for polydipsia. Genitourinary: Negative for dysuria, hematuria and urgency. Musculoskeletal: Negative for back pain and neck pain. Skin: Negative for rash. Neurological: Negative for dizziness, tremors and seizures. Hematological: Does not bruise/bleed easily. All other systems reviewed and are negative. Physical Exam:  Vitals:    04/10/19 1238   BP: 133/79   Pulse: 95   Resp: 20   Temp: 97.6 °F (36.4 °C)   TempSrc: Oral   SpO2: 94%   Weight: 244 lb (110.7 kg)   Height: 5' 8\" (1.727 m)       General: Well nourished, well developed, no acute distress  Eyes:  PERRL   Conjunctiva unremarkable   ENT:  TM's intact bilaterally, no effusion   Nasal:  Nomucosal edema     No nasal drainage   Oral:  mucosa moist and pink   Neck:  Supple   No palpable cervical lymphoadenopathy   Thyroid without mass or enlargement  Resp: Lungs CTAB   Equal and adequate air exchange without accessory muscle use   No rales, rhonchi or wheeze  CV: S1S2 RRR   No murmur   Intact distal pulses   No edema  GI: Abdomen Soft, nontender, non distended   Normoactive bowel sounds   No palpable hepatosplenomegaly  MS: Physiologic ROM of all extremities    Intact distal pulses   No clubbing or cyanosis   Skin Warmand dry; no rash or lesion  Neuro: Alert and oriented; normal and intact dtr's   Psych: Euthymic mood, congruent affect      Us Gallbladder Ruq    Result Date: 4/3/2019  Patient MRN: [id number] : 1995 Age:  30 years Gender: Male Order Date: 4/3/2019 9:31 AM Exam: US GALLBLADDER RUQ Number of Images: 24 views Indication:  R10.11 RUQ pain ruq pain Comparison: None.  Findings: Real-time sonogram study of the

## 2019-04-12 ENCOUNTER — TELEPHONE (OUTPATIENT)
Dept: SURGERY | Age: 24
End: 2019-04-12

## 2019-04-18 NOTE — PROGRESS NOTES
Vu PRE-ADMISSION TESTING INSTRUCTIONS    The Preadmission Testing patient is instructed accordingly using the following criteria (check applicable):    ARRIVAL INSTRUCTIONS:  [x] Parking the day of Surgery is located in the Main Entrance lot. Upon entering the door, make an immediate right to the surgery reception desk    [x] Complimentary [employer] Parking is available Monday through Friday 6 am to 6 pm    [x] Bring photo ID and insurance card    [] Bring in a copy of Living will or Durable Power of  papers. [x] Please be sure to arrange for responsible adult to provide transportation to and from the hospital    [x] Please arrange for responsible adult to be with you for the 24 hour period post procedure due to having anesthesia      GENERAL INSTRUCTIONS:    [x] Nothing by mouth after midnight, including gum, candy, mints or water    [x] You may brush your teeth, but do not swallow any water    [x] Take medications as instructed with 1-2 oz of water    [x] Stop herbal supplements and vitamins 5 days prior to procedure    [] Follow preop dosing of blood thinners per physician instructions    [] Take 1/2 dose of evening insulin, but no insulin after midnight    [] No oral diabetic medications after midnight    [] If diabetic and have low blood sugar or feel symptomatic, take 1-2oz apple juice only    [] Bring inhalers day of surgery    [] Bring C-PAP/ Bi-Pap day of surgery    [] Bring urine specimen day of surgery    [x] Shower or bath with soap, lather and rinse well, AM of Surgery, no lotion, powders or creams to surgical site    [] Follow bowel prep as instructed per surgeon    [x] No tobacco products within 24 hours of surgery     [x] No alcohol or illegal drug use within 24 hours of surgery.     [x] Jewelry, body piercing's, eyeglasses, contact lenses and dentures are not permitted into surgery (bring cases)      [] Please do not wear any nail polish, make up or hair products on the day of surgery    [x] If not already done, you can expect a call from registration    [x] You can expect a call the business day prior to procedure to notify you if your arrival time changes    [x] If you receive a survey after surgery we would greatly appreciate your comments    [] Parent/guardian of a minor must accompany their child and remain on the premises  the entire time they are under our care     [] Pediatric patients may bring favorite toy, blanket or comfort item with them    [] A caregiver or family member must remain with the patient during their stay if they are mentally handicapped, have dementia, disoriented or unable to use a call light or would be a safety concern if left unattended    [x] Please notify surgeon if you develop any illness between now and time of surgery (cold, cough, sore throat, fever, nausea, vomiting) or any signs of infections  including skin, wounds, and dental.    [x]  The Outpatient Pharmacy is available to fill your prescription here on your day of surgery, ask your preop nurse for details    [x] Other instructions  EDUCATIONAL MATERIALS PROVIDED:    [] PAT Preoperative Education Packet/Booklet     [] Medication List    [] Fluoroscopy Information Pamphlet    [] Transfusion bracelet applied with instructions    [] Joint replacement video reviewed    [] Shower with soap, lather and rinse well, and use CHG wipes provided the evening before surgery as instructed

## 2019-04-19 ENCOUNTER — ANESTHESIA (OUTPATIENT)
Dept: OPERATING ROOM | Age: 24
End: 2019-04-19
Payer: COMMERCIAL

## 2019-04-19 ENCOUNTER — ANESTHESIA EVENT (OUTPATIENT)
Dept: OPERATING ROOM | Age: 24
End: 2019-04-19
Payer: COMMERCIAL

## 2019-04-19 ENCOUNTER — HOSPITAL ENCOUNTER (OUTPATIENT)
Age: 24
Setting detail: OUTPATIENT SURGERY
Discharge: HOME | End: 2019-04-19
Attending: SURGERY | Admitting: SURGERY
Payer: COMMERCIAL

## 2019-04-19 ENCOUNTER — HOSPITAL ENCOUNTER (OUTPATIENT)
Dept: GENERAL RADIOLOGY | Age: 24
Discharge: HOME | End: 2019-04-21
Attending: SURGERY
Payer: COMMERCIAL

## 2019-04-19 VITALS
RESPIRATION RATE: 8 BRPM | OXYGEN SATURATION: 98 % | TEMPERATURE: 97.3 F | SYSTOLIC BLOOD PRESSURE: 111 MMHG | DIASTOLIC BLOOD PRESSURE: 55 MMHG

## 2019-04-19 VITALS
BODY MASS INDEX: 36.98 KG/M2 | SYSTOLIC BLOOD PRESSURE: 124 MMHG | OXYGEN SATURATION: 98 % | HEART RATE: 74 BPM | DIASTOLIC BLOOD PRESSURE: 71 MMHG | RESPIRATION RATE: 16 BRPM | WEIGHT: 244 LBS | TEMPERATURE: 97.4 F | HEIGHT: 68 IN

## 2019-04-19 DIAGNOSIS — R52 PAIN: ICD-10-CM

## 2019-04-19 DIAGNOSIS — K80.20 SYMPTOMATIC CHOLELITHIASIS: Primary | ICD-10-CM

## 2019-04-19 LAB
ALBUMIN SERPL-MCNC: 4.4 G/DL (ref 3.5–5.2)
ALP BLD-CCNC: 64 U/L (ref 40–129)
ALT SERPL-CCNC: 22 U/L (ref 0–40)
ANION GAP SERPL CALCULATED.3IONS-SCNC: 8 MMOL/L (ref 7–16)
AST SERPL-CCNC: 19 U/L (ref 0–39)
BASOPHILS ABSOLUTE: 0.03 E9/L (ref 0–0.2)
BASOPHILS RELATIVE PERCENT: 0.4 % (ref 0–2)
BILIRUB SERPL-MCNC: 0.3 MG/DL (ref 0–1.2)
BUN BLDV-MCNC: 7 MG/DL (ref 6–20)
CALCIUM SERPL-MCNC: 8.9 MG/DL (ref 8.6–10.2)
CHLORIDE BLD-SCNC: 106 MMOL/L (ref 98–107)
CO2: 27 MMOL/L (ref 22–29)
CREAT SERPL-MCNC: 0.9 MG/DL (ref 0.7–1.2)
EOSINOPHILS ABSOLUTE: 0.24 E9/L (ref 0.05–0.5)
EOSINOPHILS RELATIVE PERCENT: 3.4 % (ref 0–6)
GFR AFRICAN AMERICAN: >60
GFR NON-AFRICAN AMERICAN: >60 ML/MIN/1.73
GLUCOSE BLD-MCNC: 98 MG/DL (ref 74–99)
HCT VFR BLD CALC: 48.4 % (ref 37–54)
HEMOGLOBIN: 15.8 G/DL (ref 12.5–16.5)
IMMATURE GRANULOCYTES #: 0.01 E9/L
IMMATURE GRANULOCYTES %: 0.1 % (ref 0–5)
LYMPHOCYTES ABSOLUTE: 1.93 E9/L (ref 1.5–4)
LYMPHOCYTES RELATIVE PERCENT: 27.3 % (ref 20–42)
MCH RBC QN AUTO: 30.9 PG (ref 26–35)
MCHC RBC AUTO-ENTMCNC: 32.6 % (ref 32–34.5)
MCV RBC AUTO: 94.7 FL (ref 80–99.9)
MONOCYTES ABSOLUTE: 0.73 E9/L (ref 0.1–0.95)
MONOCYTES RELATIVE PERCENT: 10.3 % (ref 2–12)
NEUTROPHILS ABSOLUTE: 4.14 E9/L (ref 1.8–7.3)
NEUTROPHILS RELATIVE PERCENT: 58.5 % (ref 43–80)
PDW BLD-RTO: 12.5 FL (ref 11.5–15)
PLATELET # BLD: 242 E9/L (ref 130–450)
PMV BLD AUTO: 11.4 FL (ref 7–12)
POTASSIUM REFLEX MAGNESIUM: 4.1 MMOL/L (ref 3.5–5)
RBC # BLD: 5.11 E12/L (ref 3.8–5.8)
SODIUM BLD-SCNC: 141 MMOL/L (ref 132–146)
TOTAL PROTEIN: 7 G/DL (ref 6.4–8.3)
WBC # BLD: 7.1 E9/L (ref 4.5–11.5)

## 2019-04-19 PROCEDURE — 6360000002 HC RX W HCPCS: Performed by: NURSE ANESTHETIST, CERTIFIED REGISTERED

## 2019-04-19 PROCEDURE — 36415 COLL VENOUS BLD VENIPUNCTURE: CPT

## 2019-04-19 PROCEDURE — 3600000014 HC SURGERY LEVEL 4 ADDTL 15MIN: Performed by: SURGERY

## 2019-04-19 PROCEDURE — 2500000003 HC RX 250 WO HCPCS: Performed by: SURGERY

## 2019-04-19 PROCEDURE — 6360000002 HC RX W HCPCS: Performed by: ANESTHESIOLOGY

## 2019-04-19 PROCEDURE — 2709999900 HC NON-CHARGEABLE SUPPLY: Performed by: SURGERY

## 2019-04-19 PROCEDURE — 2500000003 HC RX 250 WO HCPCS: Performed by: NURSE ANESTHETIST, CERTIFIED REGISTERED

## 2019-04-19 PROCEDURE — 7100000000 HC PACU RECOVERY - FIRST 15 MIN: Performed by: SURGERY

## 2019-04-19 PROCEDURE — 80053 COMPREHEN METABOLIC PANEL: CPT

## 2019-04-19 PROCEDURE — 6360000002 HC RX W HCPCS

## 2019-04-19 PROCEDURE — 3600000004 HC SURGERY LEVEL 4 BASE: Performed by: SURGERY

## 2019-04-19 PROCEDURE — 6370000000 HC RX 637 (ALT 250 FOR IP)

## 2019-04-19 PROCEDURE — 7100000011 HC PHASE II RECOVERY - ADDTL 15 MIN: Performed by: SURGERY

## 2019-04-19 PROCEDURE — 47563 LAPARO CHOLECYSTECTOMY/GRAPH: CPT | Performed by: SURGERY

## 2019-04-19 PROCEDURE — 6360000004 HC RX CONTRAST MEDICATION: Performed by: SURGERY

## 2019-04-19 PROCEDURE — 3700000000 HC ANESTHESIA ATTENDED CARE: Performed by: SURGERY

## 2019-04-19 PROCEDURE — 3209999900 FLUORO FOR SURGICAL PROCEDURES

## 2019-04-19 PROCEDURE — 3700000001 HC ADD 15 MINUTES (ANESTHESIA): Performed by: SURGERY

## 2019-04-19 PROCEDURE — 85025 COMPLETE CBC W/AUTO DIFF WBC: CPT

## 2019-04-19 PROCEDURE — 7100000001 HC PACU RECOVERY - ADDTL 15 MIN: Performed by: SURGERY

## 2019-04-19 PROCEDURE — 2720000010 HC SURG SUPPLY STERILE: Performed by: SURGERY

## 2019-04-19 PROCEDURE — 7100000010 HC PHASE II RECOVERY - FIRST 15 MIN: Performed by: SURGERY

## 2019-04-19 PROCEDURE — 88304 TISSUE EXAM BY PATHOLOGIST: CPT

## 2019-04-19 PROCEDURE — 6360000002 HC RX W HCPCS: Performed by: SURGERY

## 2019-04-19 PROCEDURE — 74300 X-RAY BILE DUCTS/PANCREAS: CPT | Performed by: SURGERY

## 2019-04-19 PROCEDURE — 2580000003 HC RX 258: Performed by: SURGERY

## 2019-04-19 RX ORDER — DOCUSATE SODIUM 100 MG/1
100 CAPSULE, LIQUID FILLED ORAL 2 TIMES DAILY
Qty: 60 CAPSULE | Refills: 0 | Status: SHIPPED | OUTPATIENT
Start: 2019-04-19 | End: 2019-05-19

## 2019-04-19 RX ORDER — BUPIVACAINE HYDROCHLORIDE 5 MG/ML
INJECTION, SOLUTION EPIDURAL; INTRACAUDAL PRN
Status: DISCONTINUED | OUTPATIENT
Start: 2019-04-19 | End: 2019-04-19 | Stop reason: ALTCHOICE

## 2019-04-19 RX ORDER — MORPHINE SULFATE 2 MG/ML
INJECTION, SOLUTION INTRAMUSCULAR; INTRAVENOUS
Status: COMPLETED
Start: 2019-04-19 | End: 2019-04-19

## 2019-04-19 RX ORDER — MORPHINE SULFATE 2 MG/ML
2 INJECTION, SOLUTION INTRAMUSCULAR; INTRAVENOUS EVERY 5 MIN PRN
Status: DISCONTINUED | OUTPATIENT
Start: 2019-04-19 | End: 2019-04-19 | Stop reason: HOSPADM

## 2019-04-19 RX ORDER — IBUPROFEN 200 MG
200 TABLET ORAL EVERY 6 HOURS PRN
COMMUNITY
End: 2020-01-01

## 2019-04-19 RX ORDER — SUCCINYLCHOLINE CHLORIDE 20 MG/ML
INJECTION INTRAMUSCULAR; INTRAVENOUS PRN
Status: DISCONTINUED | OUTPATIENT
Start: 2019-04-19 | End: 2019-04-19 | Stop reason: SDUPTHER

## 2019-04-19 RX ORDER — SODIUM CHLORIDE 9 MG/ML
INJECTION, SOLUTION INTRAVENOUS CONTINUOUS
Status: DISCONTINUED | OUTPATIENT
Start: 2019-04-19 | End: 2019-04-19 | Stop reason: HOSPADM

## 2019-04-19 RX ORDER — PROPOFOL 10 MG/ML
INJECTION, EMULSION INTRAVENOUS PRN
Status: DISCONTINUED | OUTPATIENT
Start: 2019-04-19 | End: 2019-04-19 | Stop reason: SDUPTHER

## 2019-04-19 RX ORDER — HYDROMORPHONE HCL 110MG/55ML
PATIENT CONTROLLED ANALGESIA SYRINGE INTRAVENOUS PRN
Status: DISCONTINUED | OUTPATIENT
Start: 2019-04-19 | End: 2019-04-19 | Stop reason: SDUPTHER

## 2019-04-19 RX ORDER — KETOROLAC TROMETHAMINE 30 MG/ML
INJECTION, SOLUTION INTRAMUSCULAR; INTRAVENOUS PRN
Status: DISCONTINUED | OUTPATIENT
Start: 2019-04-19 | End: 2019-04-19 | Stop reason: SDUPTHER

## 2019-04-19 RX ORDER — LIDOCAINE HYDROCHLORIDE 20 MG/ML
INJECTION, SOLUTION EPIDURAL; INFILTRATION; INTRACAUDAL; PERINEURAL PRN
Status: DISCONTINUED | OUTPATIENT
Start: 2019-04-19 | End: 2019-04-19 | Stop reason: SDUPTHER

## 2019-04-19 RX ORDER — ONDANSETRON 2 MG/ML
INJECTION INTRAMUSCULAR; INTRAVENOUS PRN
Status: DISCONTINUED | OUTPATIENT
Start: 2019-04-19 | End: 2019-04-19 | Stop reason: SDUPTHER

## 2019-04-19 RX ORDER — MORPHINE SULFATE 2 MG/ML
1 INJECTION, SOLUTION INTRAMUSCULAR; INTRAVENOUS EVERY 5 MIN PRN
Status: DISCONTINUED | OUTPATIENT
Start: 2019-04-19 | End: 2019-04-19 | Stop reason: HOSPADM

## 2019-04-19 RX ORDER — DEXAMETHASONE SODIUM PHOSPHATE 4 MG/ML
INJECTION, SOLUTION INTRA-ARTICULAR; INTRALESIONAL; INTRAMUSCULAR; INTRAVENOUS; SOFT TISSUE PRN
Status: DISCONTINUED | OUTPATIENT
Start: 2019-04-19 | End: 2019-04-19 | Stop reason: SDUPTHER

## 2019-04-19 RX ORDER — HYDROCODONE BITARTRATE AND ACETAMINOPHEN 5; 325 MG/1; MG/1
1 TABLET ORAL
Status: CANCELLED | OUTPATIENT
Start: 2019-04-19 | End: 2019-04-19

## 2019-04-19 RX ORDER — SODIUM CHLORIDE 0.9 % (FLUSH) 0.9 %
10 SYRINGE (ML) INJECTION PRN
Status: DISCONTINUED | OUTPATIENT
Start: 2019-04-19 | End: 2019-04-19 | Stop reason: HOSPADM

## 2019-04-19 RX ORDER — SODIUM CHLORIDE 0.9 % (FLUSH) 0.9 %
10 SYRINGE (ML) INJECTION EVERY 12 HOURS SCHEDULED
Status: DISCONTINUED | OUTPATIENT
Start: 2019-04-19 | End: 2019-04-19 | Stop reason: HOSPADM

## 2019-04-19 RX ORDER — HYDROCODONE BITARTRATE AND ACETAMINOPHEN 5; 325 MG/1; MG/1
1 TABLET ORAL EVERY 6 HOURS PRN
Qty: 12 TABLET | Refills: 0 | Status: SHIPPED | OUTPATIENT
Start: 2019-04-19 | End: 2019-04-22

## 2019-04-19 RX ORDER — FENTANYL CITRATE 50 UG/ML
INJECTION, SOLUTION INTRAMUSCULAR; INTRAVENOUS PRN
Status: DISCONTINUED | OUTPATIENT
Start: 2019-04-19 | End: 2019-04-19 | Stop reason: SDUPTHER

## 2019-04-19 RX ORDER — HYDROCODONE BITARTRATE AND ACETAMINOPHEN 5; 325 MG/1; MG/1
TABLET ORAL
Status: COMPLETED
Start: 2019-04-19 | End: 2019-04-19

## 2019-04-19 RX ORDER — ROCURONIUM BROMIDE 10 MG/ML
INJECTION, SOLUTION INTRAVENOUS PRN
Status: DISCONTINUED | OUTPATIENT
Start: 2019-04-19 | End: 2019-04-19 | Stop reason: SDUPTHER

## 2019-04-19 RX ADMIN — SUCCINYLCHOLINE CHLORIDE 120 MG: 20 INJECTION, SOLUTION INTRAMUSCULAR; INTRAVENOUS at 10:05

## 2019-04-19 RX ADMIN — ROCURONIUM BROMIDE 30 MG: 10 SOLUTION INTRAVENOUS at 10:14

## 2019-04-19 RX ADMIN — HYDROMORPHONE HYDROCHLORIDE 1 MG: 2 INJECTION, SOLUTION INTRAMUSCULAR; INTRAVENOUS; SUBCUTANEOUS at 11:15

## 2019-04-19 RX ADMIN — ROCURONIUM BROMIDE 10 MG: 10 SOLUTION INTRAVENOUS at 10:05

## 2019-04-19 RX ADMIN — DEXAMETHASONE SODIUM PHOSPHATE 12 MG: 4 INJECTION, SOLUTION INTRAMUSCULAR; INTRAVENOUS at 10:05

## 2019-04-19 RX ADMIN — MORPHINE SULFATE 2 MG: 2 INJECTION, SOLUTION INTRAMUSCULAR; INTRAVENOUS at 11:45

## 2019-04-19 RX ADMIN — FENTANYL CITRATE 100 MCG: 50 INJECTION, SOLUTION INTRAMUSCULAR; INTRAVENOUS at 10:05

## 2019-04-19 RX ADMIN — CEFOXITIN 2 G: 1 INJECTION, POWDER, FOR SOLUTION INTRAVENOUS at 10:00

## 2019-04-19 RX ADMIN — HYDROCODONE BITARTRATE AND ACETAMINOPHEN 1 TABLET: 5; 325 TABLET ORAL at 12:21

## 2019-04-19 RX ADMIN — ONDANSETRON HYDROCHLORIDE 4 MG: 2 INJECTION, SOLUTION INTRAMUSCULAR; INTRAVENOUS at 10:05

## 2019-04-19 RX ADMIN — HYDROMORPHONE HYDROCHLORIDE 1 MG: 2 INJECTION, SOLUTION INTRAMUSCULAR; INTRAVENOUS; SUBCUTANEOUS at 11:05

## 2019-04-19 RX ADMIN — MORPHINE SULFATE 2 MG: 2 INJECTION, SOLUTION INTRAMUSCULAR; INTRAVENOUS at 11:40

## 2019-04-19 RX ADMIN — LIDOCAINE HYDROCHLORIDE 100 MG: 20 INJECTION, SOLUTION EPIDURAL; INFILTRATION; INTRACAUDAL; PERINEURAL at 10:05

## 2019-04-19 RX ADMIN — PROPOFOL 200 MG: 10 INJECTION, EMULSION INTRAVENOUS at 10:05

## 2019-04-19 RX ADMIN — FENTANYL CITRATE 150 MCG: 50 INJECTION, SOLUTION INTRAMUSCULAR; INTRAVENOUS at 10:25

## 2019-04-19 RX ADMIN — KETOROLAC TROMETHAMINE 30 MG: 30 INJECTION, SOLUTION INTRAMUSCULAR; INTRAVENOUS at 10:59

## 2019-04-19 RX ADMIN — SODIUM CHLORIDE: 9 INJECTION, SOLUTION INTRAVENOUS at 09:59

## 2019-04-19 ASSESSMENT — PAIN DESCRIPTION - PAIN TYPE
TYPE: SURGICAL PAIN

## 2019-04-19 ASSESSMENT — PULMONARY FUNCTION TESTS
PIF_VALUE: 26
PIF_VALUE: 7
PIF_VALUE: 0
PIF_VALUE: 26
PIF_VALUE: 26
PIF_VALUE: 2
PIF_VALUE: 23
PIF_VALUE: 31
PIF_VALUE: 26
PIF_VALUE: 2
PIF_VALUE: 23
PIF_VALUE: 25
PIF_VALUE: 26
PIF_VALUE: 26
PIF_VALUE: 1
PIF_VALUE: 23
PIF_VALUE: 22
PIF_VALUE: 26
PIF_VALUE: 31
PIF_VALUE: 15
PIF_VALUE: 15
PIF_VALUE: 23
PIF_VALUE: 24
PIF_VALUE: 25
PIF_VALUE: 2
PIF_VALUE: 15
PIF_VALUE: 0
PIF_VALUE: 14
PIF_VALUE: 2
PIF_VALUE: 26
PIF_VALUE: 25
PIF_VALUE: 25
PIF_VALUE: 26
PIF_VALUE: 1
PIF_VALUE: 15
PIF_VALUE: 2
PIF_VALUE: 26
PIF_VALUE: 0
PIF_VALUE: 25
PIF_VALUE: 19
PIF_VALUE: 22
PIF_VALUE: 2
PIF_VALUE: 24
PIF_VALUE: 42
PIF_VALUE: 26
PIF_VALUE: 24
PIF_VALUE: 0
PIF_VALUE: 22
PIF_VALUE: 7
PIF_VALUE: 23
PIF_VALUE: 25
PIF_VALUE: 27
PIF_VALUE: 0
PIF_VALUE: 22
PIF_VALUE: 26
PIF_VALUE: 29
PIF_VALUE: 26
PIF_VALUE: 31
PIF_VALUE: 26
PIF_VALUE: 22
PIF_VALUE: 0
PIF_VALUE: 24
PIF_VALUE: 25
PIF_VALUE: 3
PIF_VALUE: 0
PIF_VALUE: 27
PIF_VALUE: 3
PIF_VALUE: 27
PIF_VALUE: 26
PIF_VALUE: 1
PIF_VALUE: 25
PIF_VALUE: 31
PIF_VALUE: 24
PIF_VALUE: 2

## 2019-04-19 ASSESSMENT — PAIN DESCRIPTION - DESCRIPTORS
DESCRIPTORS: ACHING

## 2019-04-19 ASSESSMENT — PAIN DESCRIPTION - LOCATION
LOCATION: ABDOMEN

## 2019-04-19 ASSESSMENT — PAIN SCALES - GENERAL
PAINLEVEL_OUTOF10: 6
PAINLEVEL_OUTOF10: 7
PAINLEVEL_OUTOF10: 6
PAINLEVEL_OUTOF10: 7
PAINLEVEL_OUTOF10: 6
PAINLEVEL_OUTOF10: 7
PAINLEVEL_OUTOF10: 5
PAINLEVEL_OUTOF10: 7

## 2019-04-19 ASSESSMENT — PAIN - FUNCTIONAL ASSESSMENT: PAIN_FUNCTIONAL_ASSESSMENT: 0-10

## 2019-04-19 NOTE — PROGRESS NOTES
CLINICAL PHARMACY NOTE: MEDS TO [address] Select Patient?: No  Total # of Prescriptions Filled: 1   The following medications were delivered to the patient:  · Hydrocodone 5-325 mg  Total # of Interventions Completed: 5  Time Spent (min): 45    Additional Documentation:

## 2019-04-19 NOTE — ANESTHESIA PRE PROCEDURE
Department of Anesthesiology  Preprocedure Note       Name:  Alex Guaman   Age:  30 y.o.  :  1995                                          MRN:  [id number]         Date:  2019      Surgeon: Janett Castillo):  Denita Omalley DO    Procedure: CHOLECYSTECTOMY LAPAROSCOPIC POSS OPEN POSS CHOLANGIOGRAM (N/A Abdomen)    Medications prior to admission:   Prior to Admission medications    Medication Sig Start Date End Date Taking?  Authorizing Provider   ibuprofen (ADVIL;MOTRIN) 200 MG tablet Take 200 mg by mouth every 6 hours as needed for Pain   Yes Historical Provider, MD   pantoprazole (PROTONIX) 40 MG tablet Take 1 tablet by mouth every morning (before breakfast)  Patient taking differently: Take 40 mg by mouth every morning (before breakfast) Instructed to take am of procedure 19   Shagufta Bull DO       Current medications:    Current Facility-Administered Medications   Medication Dose Route Frequency Provider Last Rate Last Dose    0.9 % sodium chloride infusion   Intravenous Continuous Denita Reddispadmini, DO        cefOXitin (MEFOXIN) 2 g in dextrose 5% 50 mL (mini-bag)  2 g Intravenous On Call to [facility],         sodium chloride flush 0.9 % injection 10 mL  10 mL Intravenous 2 times per day Denita Reddispadmini, DO        sodium chloride flush 0.9 % injection 10 mL  10 mL Intravenous PRN Denita Omalley, DO           Allergies:  No Known Allergies    Problem List:    Patient Active Problem List   Diagnosis Code    Tobacco abuse Z72.0    Intracranial arachnoid cyst G93.0    GERD (gastroesophageal reflux disease) K21.9    Hyperlipidemia E78.5    Epigastric pain R10.13       Past Medical History:        Diagnosis Date    Gallstones     for OR -19     GERD (gastroesophageal reflux disease)     Hyperlipidemia     was on medication in past, age 10 on zocor    Hypertension     never on meds, dxed by last PCP    Intracranial arachnoid cyst 2016    per CT 16-Left posterior cranial fossa- monitored by Dr. Kraig Aschoff Tobacco abuse        Past Surgical History:        Procedure Laterality Date    TYMPANOSTOMY TUBE PLACEMENT      UPPER GASTROINTESTINAL ENDOSCOPY N/A 3/25/2019    EGD BIOPSY performed by Galina Saunders DO at [facility] ENDOSCOPY       Social History:    Social History     Tobacco Use    Smoking status: Current Every Day Smoker     Packs/day: 1.00     Years: 17.00     Pack years: 17.00     Types: Cigarettes     Start date: 1/1/2009    Smokeless tobacco: Never Used    Tobacco comment: started age 10 and smoked up to a pack a day   Substance Use Topics    Alcohol use: Yes     Alcohol/week: 0.0 oz     Comment: Occasionally-socially                                Ready to quit: Not Answered  Counseling given: Not Answered  Comment: started age 10 and smoked up to a pack a day      Vital Signs (Current):   Vitals:    04/18/19 1215 04/19/19 0813   BP:  124/69   Pulse:  86   Resp:  18   Temp:  97.6 °F (36.4 °C)   TempSrc:  Temporal   SpO2:  95%   Weight: 244 lb (110.7 kg) 244 lb (110.7 kg)   Height: 5' 8\" (1.727 m) 5' 8\" (1.727 m)                                              BP Readings from Last 3 Encounters:   04/19/19 124/69   04/10/19 133/79   03/25/19 129/70       NPO Status: Time of last liquid consumption: 2359                        Time of last solid consumption: 2100                        Date of last liquid consumption: 04/18/19                        Date of last solid food consumption: 04/18/19    BMI:   Wt Readings from Last 3 Encounters:   04/19/19 244 lb (110.7 kg)   04/10/19 244 lb (110.7 kg)   03/25/19 250 lb (113.4 kg)     Body mass index is 37.1 kg/m².     CBC:   Lab Results   Component Value Date    WBC 7.1 04/19/2019    RBC 5.11 04/19/2019    HGB 15.8 04/19/2019    HCT 48.4 04/19/2019    MCV 94.7 04/19/2019    RDW 12.5 04/19/2019     04/19/2019       CMP:   Lab Results   Component Value Date     04/19/2019    K 4.1 04/19/2019     04/19/2019    CO2 27 04/19/2019    BUN 7 04/19/2019    CREATININE 0.9 04/19/2019    GFRAA >60 04/19/2019    LABGLOM >60 04/19/2019    GLUCOSE 98 04/19/2019    PROT 7.0 04/19/2019    CALCIUM 8.9 04/19/2019    BILITOT 0.3 04/19/2019    ALKPHOS 64 04/19/2019    AST 19 04/19/2019    ALT 22 04/19/2019       POC Tests: No results for input(s): POCGLU, POCNA, POCK, POCCL, POCBUN, POCHEMO, POCHCT in the last 72 hours. Coags:   Lab Results   Component Value Date    PROTIME 13.3 03/24/2013    INR 1.3 03/24/2013    APTT 30.1 03/24/2013       HCG (If Applicable): No results found for: PREGTESTUR, PREGSERUM, HCG, HCGQUANT     ABGs: No results found for: PHART, PO2ART, RFY9GSY, ZJB0EFQ, BEART, L8BOLOJR     Type & Screen (If Applicable):  No results found for: [facility]    Anesthesia Evaluation  Patient summary reviewed and Nursing notes reviewed no history of anesthetic complications:   Airway: Mallampati: II  TM distance: >3 FB   Neck ROM: full  Mouth opening: > = 3 FB Dental:      Comment: None loose    Pulmonary: breath sounds clear to auscultation                            ROS comment: 1 ppd cigs   Cardiovascular:    (+) hypertension:,         Rhythm: regular  Rate: normal                    Neuro/Psych:   Negative Neuro/Psych ROS              GI/Hepatic/Renal:   (+) GERD:,          ROS comment: Cholelithiasis  obesity. Endo/Other: Negative Endo/Other ROS                    Abdominal:           Vascular: negative vascular ROS. Anesthesia Plan      general     ASA 2             Anesthetic plan and risks discussed with patient and mother. Plan discussed with CRNA. Seda Crabtree DO   4/19/2019      Patient seen and examined, chart reviewed, agree with above findings. Anesthetic plan, risks, benefits, alternatives, and personnel involved discussed with patient. Patient verbalized an understanding and agreed to proceed.   NPO status confirmed. Anesthetic plan discussed with care team members and agreed upon.     Ashok Martínez DO   4/19/2019  9:48 AM

## 2019-04-19 NOTE — INTERVAL H&P NOTE
H&P Update    Patient's History and Physical was reviewed. Patient examined. There has been no change.     Carol Noonan

## 2019-04-19 NOTE — ANESTHESIA POSTPROCEDURE EVALUATION
Department of Anesthesiology  Postprocedure Note    Patient: Becky Velazquez  MRN: [id number]  YOB: 1995  Date of evaluation: 4/19/2019  Time:  3:18 PM     Procedure Summary     Date:  04/19/19 Room / Location:  [facility] OR 08 / [facility] OR    Anesthesia Start:  8908 Anesthesia Stop:  1120    Procedure:  CHOLECYSTECTOMY LAPAROSCOPIC POSS OPEN POSS CHOLANGIOGRAM (N/A Abdomen) Diagnosis:  (CHOLECYSTITIS)    Surgeon:  Fredy Dickens DO Responsible Provider:  Gwynne Oppenheim, DO    Anesthesia Type:  general ASA Status:  2          Anesthesia Type: general    Beata Phase I: Beata Score: 10    Beata Phase II: Beata Score: 10    Last vitals: Reviewed and per EMR flowsheets.        Anesthesia Post Evaluation    Patient location during evaluation: PACU  Patient participation: complete - patient participated  Level of consciousness: awake and alert  Pain score: 1  Airway patency: patent  Nausea & Vomiting: no nausea and no vomiting  Complications: no  Cardiovascular status: hemodynamically stable  Respiratory status: acceptable  Hydration status: euvolemic

## 2019-04-19 NOTE — OP NOTE
OPERATIVE NOTE      PREOPERATIVE DIAGNOSES: Symptomatic cholelithasis     POSTOPERATIVE DIAGNOSES: Same    OPERATION: Laparoscopic cholecystectomy with intraoperative cholangiogram.    SURGEON: Dr. Kay Denson DO    ASSISTANT: [address]: General.      ESTIMATED BLOOD LOSS: 10 mL. COMPLICATIONS: None. SPECIMEN: Gallbladder. Findings:  Normal intraoperative cholangiogram, no filling defects in CBD, free flow of contrast into duodenum. No radiologist present for administration of flouro or for interpretation of exam.    BRIEF MEDICAL HISTORY: This is a 30 y.o male with a history of symptomatic cholelithiasis. DESCRIPTION OF PROCEDURE: The patient was brought into the operating room   theater, placed supine on the operating room table, administered general   Anesthesia. The abdomen was then prepped and draped in a normal sterile fashion. Sharp towel clips were then used to grasp either side of the umbilicus. A small 5 mm incision was made at the superior aspect of the umbilicus. An OptiView Trocar with a 5 mm 30 degree laparoscope was inserted through this incision. Attempts to enter the abdomen were unsuccessful secondary to the Trocar length not being long enough. Therefore,  a Veress needle was inserted and confirmed to be in place with the saline drip test. It was then hooked to CO2 and the abdomen was insufflated to 15 mmHg. The Veress needle was then removed. A 5-mm port was then inserted through this same incision using an OptiView Trocar. A 5-mm camera was then inserted through the port. The abdomen was inspected. In a similar fashion, a 10-mm port was inserted in the subxiphoid area and a single 5-mm port was inserted in the right upper quadrant. An atraumatic grasper was inserted in the right lateral Trocar and the gallbladder was retracted cephalad and laterally.  A [place] grasper was inserted into the 10 mm Trocar port and dissection was carried out from a lateral to medial approach. The cystic duct was cleared of its peritoneal attachments. Once freed, a Parker clamp was placed over the infundibulum of the gallbladder and a catheter was inserted into the gallbladder. Bile was aspirated. Next, an intraoperative cholangiogram was performed. There was proper visualization of the cystic duct, common bile duct, and hepatic duct. Contrast was seen within the duodenum. There was no obstructions. No radiologist was present for an official read. Next, bile was aspirated from the catheter until the gallbladder was empty. The catheter was removed. The cystic duct was then ligated with four medium clamps, three clips distal and one clip proximal. The cystic duct was transected with laparoscopic scissors. Next, the cystic artery was dissected free from its attachments. Three medium clips were placed on the artery, two distal and one proximal. The cystic artery was transected with laparoscopic scissors. The gallbladder was removed from the fossa using electrocautery. There was a small amount of bile spillage, with was suctioned clear. Once removed, the gallbladder was placed in a laparoscopic retrieval bag and removed. Hemostasis was checked. The gallbladder fossa and suprahepatic space was irrigated until clear. Clips remained in place. We decompressed the abdomen and then we placed a single   figure-of-eight 2-0 Vicryl stitch in the subxiphoid 10-mm port site and then   reapproximated the skin with 4-0 Vicryl suture in a simple interrupted   fashion and ran a subcuticular 4-0 Vicryl stitch in the subxiphoid port site. Dermabond was placed over the top. The patient was awakened in the operating   room theater without any difficulty. All lap counts, instrument counts, and needle counts were correct at the completion of the procedure. Dr. Ebonie Balderas was present and scrubbed for the entire procedure.     Electronically signed by Brice Walden  4/19/2019  11:19 AM

## 2019-05-01 ENCOUNTER — TELEPHONE (OUTPATIENT)
Dept: SURGERY | Age: 24
End: 2019-05-01

## 2019-05-06 ENCOUNTER — OFFICE VISIT (OUTPATIENT)
Dept: SURGERY | Age: 24
End: 2019-05-06

## 2019-05-06 VITALS
TEMPERATURE: 98.2 F | BODY MASS INDEX: 35.99 KG/M2 | OXYGEN SATURATION: 95 % | HEIGHT: 69 IN | DIASTOLIC BLOOD PRESSURE: 79 MMHG | HEART RATE: 88 BPM | WEIGHT: 243 LBS | RESPIRATION RATE: 20 BRPM | SYSTOLIC BLOOD PRESSURE: 126 MMHG

## 2019-05-06 DIAGNOSIS — K80.20 SYMPTOMATIC CHOLELITHIASIS: Primary | ICD-10-CM

## 2019-05-06 PROCEDURE — 99024 POSTOP FOLLOW-UP VISIT: CPT | Performed by: SURGERY

## 2019-05-06 NOTE — LETTER
[address] [facility], [facility] [address] [address]  Phone: [phone]  Fax: [phone] [facility],         May 6, 2019     Patient: Taya De Luna   YOB: 1995   Date of Visit: 5/6/2019       To Whom It May Concern: It is my medical opinion that Asael Nava is able to return to work on 5/7/19 without restrictions. If you have any questions or concerns, please don't hesitate to call.     Sincerely,        Araceli Mar DO

## 2019-05-06 NOTE — LETTER
[address] [facility]  [facility], [facility] [address] [address]  Phone: [phone]  Fax: [phone] [facility],         May 6, 2019     Patient: Carlos Rowe   YOB: 1995   Date of Visit: 5/6/2019       To Whom It May Concern: It is my medical opinion that Jorge Bowen is able to return to work 5/7/19 with no restrictions. If you have any questions or concerns, please don't hesitate to call.     Sincerely,        Froilan Thompson, DO

## 2019-06-08 NOTE — PROGRESS NOTES
Patient presents for postoperative visit after cholecystectomy. He reports he is doing well. He is tolerating diet. Bowels are functioning. He needs a note to go back to work. We will we will provide this for him. On exam abdomen is soft nontender. Incisions are healing appropriately. He may return to work. He is welcome to follow as needed.

## 2020-01-01 ENCOUNTER — APPOINTMENT (OUTPATIENT)
Dept: CT IMAGING | Age: 25
End: 2020-01-01
Payer: COMMERCIAL

## 2020-01-01 ENCOUNTER — HOSPITAL ENCOUNTER (EMERGENCY)
Age: 25
Discharge: HOME | End: 2020-01-02
Payer: COMMERCIAL

## 2020-01-01 PROCEDURE — 70450 CT HEAD/BRAIN W/O DYE: CPT

## 2020-01-01 PROCEDURE — 99283 EMERGENCY DEPT VISIT LOW MDM: CPT

## 2020-01-01 PROCEDURE — 72125 CT NECK SPINE W/O DYE: CPT

## 2020-01-01 ASSESSMENT — PAIN SCALES - GENERAL: PAINLEVEL_OUTOF10: 6

## 2020-01-01 ASSESSMENT — PAIN DESCRIPTION - PAIN TYPE: TYPE: ACUTE PAIN

## 2020-01-01 ASSESSMENT — PAIN DESCRIPTION - LOCATION: LOCATION: HEAD

## 2020-01-02 VITALS
OXYGEN SATURATION: 98 % | SYSTOLIC BLOOD PRESSURE: 142 MMHG | HEART RATE: 87 BPM | BODY MASS INDEX: 35.61 KG/M2 | RESPIRATION RATE: 16 BRPM | TEMPERATURE: 98.1 F | DIASTOLIC BLOOD PRESSURE: 74 MMHG | WEIGHT: 235 LBS | HEIGHT: 68 IN

## 2020-01-02 NOTE — ED PROVIDER NOTES
started smoking about 11 years ago. He has a 17.00 pack-year smoking history. He has never used smokeless tobacco. He reports current alcohol use. He reports that he does not use drugs. Family History: family history includes ADHD in his brother; COPD in his maternal grandmother; Cancer in his maternal grandfather; Down Syndrome in his maternal aunt; High Blood Pressure in his maternal aunt, maternal aunt, and maternal grandmother; High Cholesterol in his maternal grandmother; Other in his father, maternal grandmother, paternal grandmother, and sister; Seizures in his paternal grandfather; Thyroid Disease in his paternal aunt and paternal uncle. Allergies: Patient has no known allergies. Physical Exam          ED Triage Vitals [01/01/20 2324]   BP Temp Temp Source Pulse Resp SpO2 Height Weight   (!) 142/74 -- Oral 87 16 98 % 5' 8\" (1.727 m) 235 lb (106.6 kg)      Oxygen Saturation Interpretation: Normal.    Constitutional: Level of Consciousness: Awake and alert, cooperative. ETOH: No.               Distress: none. Head:  Traumatic:  no.                  Scalp Tenderness:  temporal, on the left. Deformity: no.               Skin:  normal.  Eyes:  PERRL, EOMI. No periorbital ecchymoses. Conjunctiva: normal.  Ears:  External ears without lesions. Throat:  Airway patient. Neck:  Supple. No midline tenderness, full ROM. Chest:  Symmetrical without visible rash or tenderness. Respiratory:  Clear to auscultation and breath sounds equal.  CV:  Regular rate and rhythm, normal heart sounds, without pathological murmurs. GI:  Abdomen Soft, nontender. No abrasions, ecchymoses, or lacerations. Back:  No costovertebral, paravertebral, intervertebral, or vertebral tenderness or spasm. Integument:  No abrasions, ecchymoses, or lacerations unless noted elsewhere. Extremities  No tenderness or swelling. Normal, painless range of motion. No neurovascular deficit.   Neurological: Oriented x 3,  GCS15. Motor functions intact. Lab / Imaging Results   (All laboratory and radiology results have been personally reviewed by myself)  Labs:  No results found for this visit on 01/01/20. Imaging: All Radiology results interpreted by Radiologist unless otherwise noted. CT Head WO Contrast   Final Result   1. No sign of acute intracranial injury or skull fracture. 2. No significant interval change when compared to the [address] [place] [address]    7/9/2018 2:47 PM.       This report has been electronically signed by Gin Greenfield MD.      CT Cervical Spine WO Contrast   Final Result   No sign of acute cervical spine injury. This report has been electronically signed by Gin Greenfield MD.        ED Course / Medical Decision Making   Medications - No data to display     Re-examination:  1/1/20       Time: 0045   Patient symptoms are the same. Results discussed. Consult(s):   None    Procedure(s):   none    MDM:   Patient presents to the emergency department for closed head injury. CT of the head and neck was unremarkable for acute abnormality. Vital signs stable. The patient remained well-appearing throughout his entire emergency department stay and is stable for discharge and outpatient follow-up with his primary care provider. Specific conditions for emergent return have been discussed and the patient verbalized understanding to return immediately for new or worsening symptoms. Counseling: The emergency provider has spoken with the patient and discussed todays results, in addition to providing specific details for the plan of care and counseling regarding the diagnosis and prognosis. Questions are answered at this time and they are agreeable with the plan. Assessment      1.  Injury of head, initial encounter       Plan   Discharge to home  Patient condition is good    New Medications     New Prescriptions    No medications on file     Electronically signed by Emily Verde PA-C

## 2020-02-05 ENCOUNTER — OFFICE VISIT (OUTPATIENT)
Dept: PRIMARY CARE CLINIC | Age: 25
End: 2020-02-05
Payer: COMMERCIAL

## 2020-02-05 VITALS
TEMPERATURE: 98.4 F | HEIGHT: 68 IN | SYSTOLIC BLOOD PRESSURE: 130 MMHG | WEIGHT: 235 LBS | OXYGEN SATURATION: 97 % | HEART RATE: 103 BPM | RESPIRATION RATE: 20 BRPM | BODY MASS INDEX: 35.61 KG/M2 | DIASTOLIC BLOOD PRESSURE: 88 MMHG

## 2020-02-05 LAB
INFLUENZA A ANTIBODY: NEGATIVE
INFLUENZA B ANTIBODY: NEGATIVE

## 2020-02-05 PROCEDURE — G8484 FLU IMMUNIZE NO ADMIN: HCPCS | Performed by: FAMILY MEDICINE

## 2020-02-05 PROCEDURE — G8417 CALC BMI ABV UP PARAM F/U: HCPCS | Performed by: FAMILY MEDICINE

## 2020-02-05 PROCEDURE — 99213 OFFICE O/P EST LOW 20 MIN: CPT | Performed by: FAMILY MEDICINE

## 2020-02-05 PROCEDURE — 4004F PT TOBACCO SCREEN RCVD TLK: CPT | Performed by: FAMILY MEDICINE

## 2020-02-05 PROCEDURE — G8427 DOCREV CUR MEDS BY ELIG CLIN: HCPCS | Performed by: FAMILY MEDICINE

## 2020-02-05 PROCEDURE — 87804 INFLUENZA ASSAY W/OPTIC: CPT | Performed by: FAMILY MEDICINE

## 2020-02-05 RX ORDER — PREDNISONE 20 MG/1
TABLET ORAL
Qty: 11 TABLET | Refills: 0 | Status: SHIPPED | OUTPATIENT
Start: 2020-02-05 | End: 2020-03-18

## 2020-02-05 RX ORDER — DOXYCYCLINE HYCLATE 100 MG
100 TABLET ORAL 2 TIMES DAILY
Qty: 20 TABLET | Refills: 0 | Status: SHIPPED | OUTPATIENT
Start: 2020-02-05 | End: 2020-02-15

## 2020-02-05 RX ORDER — ALBUTEROL SULFATE 90 UG/1
2 AEROSOL, METERED RESPIRATORY (INHALATION) EVERY 6 HOURS PRN
Qty: 1 INHALER | Refills: 3 | Status: SHIPPED | OUTPATIENT
Start: 2020-02-05

## 2020-02-05 ASSESSMENT — ENCOUNTER SYMPTOMS
APNEA: 0
ABDOMINAL PAIN: 0
PHOTOPHOBIA: 0
VOMITING: 0
FACIAL SWELLING: 0
WHEEZING: 1
COLOR CHANGE: 0
SINUS PRESSURE: 0
BACK PAIN: 0
BLOOD IN STOOL: 0
ALLERGIC/IMMUNOLOGIC NEGATIVE: 1
SHORTNESS OF BREATH: 0
CHEST TIGHTNESS: 0
NAUSEA: 0
DIARRHEA: 0
COUGH: 1
SORE THROAT: 0

## 2020-02-05 ASSESSMENT — PATIENT HEALTH QUESTIONNAIRE - PHQ9
1. LITTLE INTEREST OR PLEASURE IN DOING THINGS: 0
SUM OF ALL RESPONSES TO PHQ QUESTIONS 1-9: 0
SUM OF ALL RESPONSES TO PHQ9 QUESTIONS 1 & 2: 0
SUM OF ALL RESPONSES TO PHQ QUESTIONS 1-9: 0
2. FEELING DOWN, DEPRESSED OR HOPELESS: 0

## 2020-02-05 NOTE — PROGRESS NOTES
Chief Complaint:   Chief Complaint   Patient presents with    URI     cough, sore throat       URI    This is a new problem. The current episode started yesterday. The problem has been gradually worsening. The maximum temperature recorded prior to his arrival was 101 - 101.9 F. Associated symptoms include coughing and wheezing. Pertinent negatives include no abdominal pain, chest pain, congestion, diarrhea, headaches, nausea, rash, sore throat or vomiting. Patient Active Problem List   Diagnosis    Tobacco abuse    Intracranial arachnoid cyst    GERD (gastroesophageal reflux disease)    Hyperlipidemia    Epigastric pain    Symptomatic cholelithiasis       Past Medical History:   Diagnosis Date    Gallstones     for OR 4-19-19     GERD (gastroesophageal reflux disease)     Hyperlipidemia     was on medication in past, age 10 on zocor    Hypertension     never on meds, dxed by last PCP    Intracranial arachnoid cyst 05/17/2016    per CT 5/17/16-Left posterior cranial fossa- monitored by Dr. Trevon carey        Past Surgical History:   Procedure Laterality Date    CHOLECYSTECTOMY, LAPAROSCOPIC N/A 4/19/2019    CHOLECYSTECTOMY LAPAROSCOPIC POSS OPEN POSS CHOLANGIOGRAM performed by Cheri Lee DO at [facility] TYMPANOSTOMY TUBE PLACEMENT      UPPER GASTROINTESTINAL ENDOSCOPY N/A 3/25/2019    EGD BIOPSY performed by Cheri Lee DO at [facility] ENDOSCOPY       Current Outpatient Medications   Medication Sig Dispense Refill    doxycycline hyclate (VIBRA-TABS) 100 MG tablet Take 1 tablet by mouth 2 times daily for 10 days 20 tablet 0    predniSONE (DELTASONE) 20 MG tablet 2 tabs qd x 3 days  1 tab qd x 3 days 0.5 tab qd x 3 days 11 tablet 0    albuterol sulfate HFA (VENTOLIN HFA) 108 (90 Base) MCG/ACT inhaler Inhale 2 puffs into the lungs every 6 hours as needed for Wheezing 1 Inhaler 3     No current facility-administered medications for this visit.         No Known Grandmother     COPD Maternal Grandmother     Cancer Maternal Grandfather         prostate    Other Paternal Grandmother         blood clots in legs    Seizures Paternal Grandfather     High Blood Pressure Maternal Aunt     High Blood Pressure Maternal Aunt          Review of Systems   Constitutional: Negative. HENT: Negative for congestion, facial swelling, hearing loss, nosebleeds, sinus pressure and sore throat. Eyes: Negative for photophobia and visual disturbance. Respiratory: Positive for cough and wheezing. Negative for apnea, chest tightness and shortness of breath. Cardiovascular: Negative for chest pain, palpitations and leg swelling. Gastrointestinal: Negative for abdominal pain, blood in stool, diarrhea, nausea and vomiting. Genitourinary: Negative for difficulty urinating, frequency and urgency. Musculoskeletal: Negative for arthralgias, back pain, joint swelling and myalgias. Skin: Negative for color change and rash. Allergic/Immunologic: Negative. Neurological: Negative for syncope, weakness, light-headedness and headaches. Hematological: Negative. Psychiatric/Behavioral: Negative for agitation, behavioral problems, confusion and self-injury. The patient is not nervous/anxious. All other systems reviewed and are negative. Physical Exam  Vitals signs and nursing note reviewed. Constitutional:       General: He is not in acute distress. Appearance: He is well-developed. HENT:      Head: Normocephalic and atraumatic. Nose: Nose normal.   Eyes:      Conjunctiva/sclera: Conjunctivae normal.      Pupils: Pupils are equal, round, and reactive to light. Neck:      Musculoskeletal: Normal range of motion and neck supple. Thyroid: No thyromegaly. Vascular: No JVD. Cardiovascular:      Rate and Rhythm: Normal rate and regular rhythm. Heart sounds: Normal heart sounds. No murmur. No friction rub. No gallop.     Pulmonary:      Effort: Pulmonary effort is normal. No respiratory distress. Breath sounds: Wheezing and rhonchi present. Abdominal:      General: Bowel sounds are normal. There is no distension. Palpations: Abdomen is soft. Tenderness: There is no abdominal tenderness. There is no guarding or rebound. Musculoskeletal: Normal range of motion. Lymphadenopathy:      Cervical: No cervical adenopathy. Skin:     General: Skin is warm and dry. Findings: No erythema or rash. Neurological:      Mental Status: He is alert and oriented to person, place, and time. Cranial Nerves: No cranial nerve deficit. Motor: No abnormal muscle tone. Coordination: Coordination normal.      Deep Tendon Reflexes: Reflexes are normal and symmetric. Psychiatric:         Behavior: Behavior normal.         Judgment: Judgment normal.                               ASSESSMENT/PLAN:    Giorgio was seen today for uri. Diagnoses and all orders for this visit:    Cough  -     POCT Influenza A/B  -     XR CHEST STANDARD (2 VW); Future    Bronchitis  -     doxycycline hyclate (VIBRA-TABS) 100 MG tablet; Take 1 tablet by mouth 2 times daily for 10 days  -     predniSONE (DELTASONE) 20 MG tablet; 2 tabs qd x 3 days  1 tab qd x 3 days 0.5 tab qd x 3 days  -     XR CHEST STANDARD (2 VW); Future  -     albuterol sulfate HFA (VENTOLIN HFA) 108 (90 Base) MCG/ACT inhaler;  Inhale 2 puffs into the lungs every 6 hours as needed for Wheezing            Barbara Sharma DO    2/5/2020  11:18 AM

## 2020-03-18 ENCOUNTER — OFFICE VISIT (OUTPATIENT)
Dept: PRIMARY CARE CLINIC | Age: 25
End: 2020-03-18
Payer: COMMERCIAL

## 2020-03-18 VITALS
WEIGHT: 249 LBS | TEMPERATURE: 97.6 F | OXYGEN SATURATION: 97 % | HEART RATE: 97 BPM | DIASTOLIC BLOOD PRESSURE: 82 MMHG | RESPIRATION RATE: 18 BRPM | HEIGHT: 68 IN | SYSTOLIC BLOOD PRESSURE: 124 MMHG | BODY MASS INDEX: 37.74 KG/M2

## 2020-03-18 PROCEDURE — G8484 FLU IMMUNIZE NO ADMIN: HCPCS | Performed by: FAMILY MEDICINE

## 2020-03-18 PROCEDURE — G8417 CALC BMI ABV UP PARAM F/U: HCPCS | Performed by: FAMILY MEDICINE

## 2020-03-18 PROCEDURE — 99213 OFFICE O/P EST LOW 20 MIN: CPT | Performed by: FAMILY MEDICINE

## 2020-03-18 PROCEDURE — 4004F PT TOBACCO SCREEN RCVD TLK: CPT | Performed by: FAMILY MEDICINE

## 2020-03-18 PROCEDURE — G8427 DOCREV CUR MEDS BY ELIG CLIN: HCPCS | Performed by: FAMILY MEDICINE

## 2020-03-18 RX ORDER — CEPHALEXIN 500 MG/1
500 CAPSULE ORAL 3 TIMES DAILY
Qty: 30 CAPSULE | Refills: 0 | Status: SHIPPED | OUTPATIENT
Start: 2020-03-18 | End: 2020-03-28

## 2020-03-18 ASSESSMENT — ENCOUNTER SYMPTOMS
ABDOMINAL PAIN: 0
FACIAL SWELLING: 0
VOMITING: 0
SHORTNESS OF BREATH: 0
COUGH: 0
PHOTOPHOBIA: 0
NAUSEA: 0
COLOR CHANGE: 0
APNEA: 0
BLOOD IN STOOL: 0
SINUS PRESSURE: 0
WHEEZING: 0
CHEST TIGHTNESS: 0
DIARRHEA: 0
SORE THROAT: 0
BACK PAIN: 0
ALLERGIC/IMMUNOLOGIC NEGATIVE: 1

## 2020-03-18 NOTE — PROGRESS NOTES
Seizures Paternal Grandfather     High Blood Pressure Maternal Aunt     High Blood Pressure Maternal Aunt          Review of Systems   Constitutional: Negative. HENT: Negative for congestion, facial swelling, hearing loss, nosebleeds, sinus pressure and sore throat. Eyes: Negative for photophobia and visual disturbance. Respiratory: Negative for apnea, cough, chest tightness, shortness of breath and wheezing. Cardiovascular: Negative for chest pain, palpitations and leg swelling. Gastrointestinal: Negative for abdominal pain, blood in stool, diarrhea, nausea and vomiting. Genitourinary: Negative for difficulty urinating, frequency and urgency. Musculoskeletal: Negative for arthralgias, back pain, joint swelling and myalgias. Skin: Positive for rash. Negative for color change. Allergic/Immunologic: Negative. Neurological: Negative for syncope, weakness, light-headedness and headaches. Hematological: Negative. Psychiatric/Behavioral: Negative for agitation, behavioral problems, confusion and self-injury. The patient is not nervous/anxious. All other systems reviewed and are negative. Physical Exam  Vitals signs and nursing note reviewed. Constitutional:       General: He is not in acute distress. Appearance: He is well-developed. HENT:      Head: Normocephalic and atraumatic. Nose: Nose normal.   Eyes:      Conjunctiva/sclera: Conjunctivae normal.      Pupils: Pupils are equal, round, and reactive to light. Neck:      Musculoskeletal: Normal range of motion and neck supple. Thyroid: No thyromegaly. Vascular: No JVD. Cardiovascular:      Rate and Rhythm: Normal rate and regular rhythm. Heart sounds: Normal heart sounds. No murmur. No friction rub. No gallop. Pulmonary:      Effort: Pulmonary effort is normal. No respiratory distress. Breath sounds: Normal breath sounds. No wheezing.    Abdominal:      General: Bowel sounds are normal. There

## 2020-07-21 ENCOUNTER — HOSPITAL ENCOUNTER (EMERGENCY)
Age: 25
Discharge: HOME | End: 2020-07-21
Payer: COMMERCIAL

## 2020-07-21 ENCOUNTER — APPOINTMENT (OUTPATIENT)
Dept: CT IMAGING | Age: 25
End: 2020-07-21
Payer: COMMERCIAL

## 2020-07-21 VITALS
DIASTOLIC BLOOD PRESSURE: 84 MMHG | TEMPERATURE: 98.1 F | OXYGEN SATURATION: 98 % | BODY MASS INDEX: 33.34 KG/M2 | SYSTOLIC BLOOD PRESSURE: 118 MMHG | HEIGHT: 68 IN | HEART RATE: 88 BPM | RESPIRATION RATE: 16 BRPM | WEIGHT: 220 LBS

## 2020-07-21 PROCEDURE — 70450 CT HEAD/BRAIN W/O DYE: CPT

## 2020-07-21 PROCEDURE — 99284 EMERGENCY DEPT VISIT MOD MDM: CPT

## 2020-07-21 PROCEDURE — 6370000000 HC RX 637 (ALT 250 FOR IP): Performed by: PHYSICIAN ASSISTANT

## 2020-07-21 RX ORDER — DIAZEPAM 5 MG/1
5 TABLET ORAL EVERY 6 HOURS PRN
Qty: 12 TABLET | Refills: 0 | Status: SHIPPED | OUTPATIENT
Start: 2020-07-21 | End: 2020-07-24

## 2020-07-21 RX ORDER — DIAZEPAM 5 MG/1
5 TABLET ORAL ONCE
Status: COMPLETED | OUTPATIENT
Start: 2020-07-21 | End: 2020-07-21

## 2020-07-21 RX ADMIN — DIAZEPAM 5 MG: 5 TABLET ORAL at 21:24

## 2020-07-21 ASSESSMENT — PAIN DESCRIPTION - LOCATION: LOCATION: HEAD

## 2020-07-21 ASSESSMENT — PAIN SCALES - GENERAL: PAINLEVEL_OUTOF10: 7

## 2020-07-21 ASSESSMENT — PAIN DESCRIPTION - DESCRIPTORS: DESCRIPTORS: SHARP

## 2020-07-21 ASSESSMENT — PAIN DESCRIPTION - PAIN TYPE: TYPE: ACUTE PAIN

## 2020-07-22 ENCOUNTER — TELEPHONE (OUTPATIENT)
Dept: PRIMARY CARE CLINIC | Age: 25
End: 2020-07-22

## 2020-07-22 ENCOUNTER — OFFICE VISIT (OUTPATIENT)
Dept: PRIMARY CARE CLINIC | Age: 25
End: 2020-07-22
Payer: COMMERCIAL

## 2020-07-22 VITALS
TEMPERATURE: 97.9 F | SYSTOLIC BLOOD PRESSURE: 124 MMHG | WEIGHT: 230 LBS | HEIGHT: 68 IN | BODY MASS INDEX: 34.86 KG/M2 | DIASTOLIC BLOOD PRESSURE: 80 MMHG | RESPIRATION RATE: 18 BRPM | HEART RATE: 103 BPM | OXYGEN SATURATION: 96 %

## 2020-07-22 PROCEDURE — 4004F PT TOBACCO SCREEN RCVD TLK: CPT | Performed by: FAMILY MEDICINE

## 2020-07-22 PROCEDURE — G8427 DOCREV CUR MEDS BY ELIG CLIN: HCPCS | Performed by: FAMILY MEDICINE

## 2020-07-22 PROCEDURE — G8417 CALC BMI ABV UP PARAM F/U: HCPCS | Performed by: FAMILY MEDICINE

## 2020-07-22 PROCEDURE — 99213 OFFICE O/P EST LOW 20 MIN: CPT | Performed by: FAMILY MEDICINE

## 2020-07-22 RX ORDER — METHYLPREDNISOLONE 4 MG/1
TABLET ORAL
Qty: 1 KIT | Refills: 0 | Status: SHIPPED | OUTPATIENT
Start: 2020-07-22 | End: 2021-08-11 | Stop reason: ALTCHOICE

## 2020-07-22 ASSESSMENT — ENCOUNTER SYMPTOMS
SORE THROAT: 0
BLOOD IN STOOL: 0
APNEA: 0
DIARRHEA: 0
ALLERGIC/IMMUNOLOGIC NEGATIVE: 1
ABDOMINAL PAIN: 0
VOMITING: 0
WHEEZING: 0
FACIAL SWELLING: 0
SHORTNESS OF BREATH: 0
CHEST TIGHTNESS: 0
COUGH: 0
NAUSEA: 0
BACK PAIN: 0
PHOTOPHOBIA: 0
COLOR CHANGE: 0
SINUS PRESSURE: 0

## 2020-07-22 NOTE — PROGRESS NOTES
facility-administered medications for this visit. No Known Allergies    Social History     Socioeconomic History    Marital status: Single     Spouse name: None    Number of children: None    Years of education: None    Highest education level: None   Occupational History    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[employer] [facility] [place], [place] and [facility]   Social Needs    Financial resource strain: None    Food insecurity     Worry: None     Inability: None    Transportation needs     Medical: None     Non-medical: None   Tobacco Use    Smoking status: Current Every Day Smoker     Packs/day: 1.00     Years: 17.00     Pack years: 17.00     Types: Cigarettes     Start date: 1/1/2009    Smokeless tobacco: Never Used    Tobacco comment: started age 10 and smoked up to a pack a day   Substance and Sexual Activity    Alcohol use:  Yes     Alcohol/week: 0.0 standard drinks     Comment: Occasionally-socially    Drug use: No    Sexual activity: Yes     Partners: Male     Comment: Identifies as carranza   Lifestyle    Physical activity     Days per week: None     Minutes per session: None    Stress: None   Relationships    Social connections     Talks on phone: None     Gets together: None     Attends Yazdanism service: None     Active member of club or organization: None     Attends meetings of clubs or organizations: None     Relationship status: None    Intimate partner violence     Fear of current or ex partner: None     Emotionally abused: None     Physically abused: None     Forced sexual activity: None   Other Topics Concern    None   Social History Narrative    Lives alone       Family History   Problem Relation Age of Onset    Other Father         motorcycle accident    Other Sister     ADHD Brother     Down Syndrome Maternal Aunt     Thyroid Disease Paternal Aunt     Thyroid Disease Paternal Uncle     Other Maternal Grandmother         Pulmonary Emboli    High Blood Pressure Maternal Grandmother     High Cholesterol Maternal Grandmother     COPD Maternal Grandmother     Cancer Maternal Grandfather         prostate    Other Paternal Grandmother         blood clots in legs    Seizures Paternal Grandfather     High Blood Pressure Maternal Aunt     High Blood Pressure Maternal Aunt          Review of Systems   Constitutional: Negative. HENT: Negative for congestion, facial swelling, hearing loss, nosebleeds, sinus pressure and sore throat. Eyes: Negative for photophobia and visual disturbance. Respiratory: Negative for apnea, cough, chest tightness, shortness of breath and wheezing. Cardiovascular: Negative for chest pain, palpitations and leg swelling. Gastrointestinal: Negative for abdominal pain, blood in stool, diarrhea, nausea and vomiting. Genitourinary: Negative for difficulty urinating, frequency and urgency. Musculoskeletal: Positive for myalgias and neck pain. Negative for arthralgias, back pain and joint swelling. Skin: Negative for color change and rash. Allergic/Immunologic: Negative. Neurological: Negative for syncope, weakness, light-headedness and headaches. Hematological: Negative. Psychiatric/Behavioral: Negative for agitation, behavioral problems, confusion and self-injury. The patient is not nervous/anxious. All other systems reviewed and are negative. Physical Exam  Vitals signs and nursing note reviewed. Constitutional:       General: He is not in acute distress. Appearance: He is well-developed. HENT:      Head: Normocephalic and atraumatic. Nose: Nose normal.   Eyes:      Conjunctiva/sclera: Conjunctivae normal.      Pupils: Pupils are equal, round, and reactive to light. Neck:      Musculoskeletal: Normal range of motion and neck supple. Thyroid: No thyromegaly. Vascular: No JVD. Cardiovascular:      Rate and Rhythm: Normal rate and regular rhythm. Heart sounds: Normal heart sounds.

## 2020-07-22 NOTE — ED PROVIDER NOTES
[facility]       Department of Emergency Medicine   ED  Provider Note  Admit Date/RoomTime: 7/21/2020  8:24 PM  ED Room: 29/29   Chief Complaint:   Headache (X 1 week)    History of Present Illness   Source of history provided by:  patient. History/Exam Limitations: none. Anabelle Nava is a 30 y.o. old male who has a past medical hx of:   Past Medical History:   Diagnosis Date    Gallstones     for OR 4-19-19     GERD (gastroesophageal reflux disease)     Hyperlipidemia     was on medication in past, age 10 on zocor    Hypertension     never on meds, dxed by last PCP    Intracranial arachnoid cyst 05/17/2016    per CT 5/17/16-Left posterior cranial fossa- monitored by Dr. Justina Wyatt abuse     presents to the emergency department by private vehicle, for complaint of gradual onset, still present occipital aching pain which began 5 day(s) prior to arrival.  Since onset the symptoms have been getting worse, and moderate in severity. The patient has history of headaches like this and known arachnoid cyst, he follows with neurology for biannual MRI evaluations . Today's episode is somewhat typical for him. He has had MRI and neurology consult in the past. The pain is associated with bilateral neck apin and negative for numbness, weakness, speech or visual changes or fever, body aches, uri, rash. The symptoms are aggravated by nothing and relieved by nothing and he usually takes tylenol and excedrin for headaches. There has been no history of recent trauma. Treatment prior to arrival consisted of: unable to obtain relief with OTC meds with no relief of symptoms. He has a history of no anticoagulation use. ROS    Pertinent positives and negatives are stated within HPI, all other systems reviewed and are negative.     Past Surgical History:   Procedure Laterality Date    CHOLECYSTECTOMY, LAPAROSCOPIC N/A 4/19/2019    CHOLECYSTECTOMY LAPAROSCOPIC POSS OPEN POSS CHOLANGIOGRAM performed by Azael Lewis DO at [facility] TYMPANOSTOMY TUBE PLACEMENT      UPPER GASTROINTESTINAL ENDOSCOPY N/A 3/25/2019    EGD BIOPSY performed by Azael Lewis DO at [facility] History:  reports that he has been smoking cigarettes. He started smoking about 11 years ago. He has a 17.00 pack-year smoking history. He has never used smokeless tobacco. He reports current alcohol use. He reports that he does not use drugs. Family History: family history includes ADHD in his brother; COPD in his maternal grandmother; Cancer in his maternal grandfather; Down Syndrome in his maternal aunt; High Blood Pressure in his maternal aunt, maternal aunt, and maternal grandmother; High Cholesterol in his maternal grandmother; Other in his father, maternal grandmother, paternal grandmother, and sister; Seizures in his paternal grandfather; Thyroid Disease in his paternal aunt and paternal uncle. Allergies: Patient has no known allergies. Physical Exam           ED Triage Vitals [07/21/20 1947]   BP Temp Temp Source Pulse Resp SpO2 Height Weight   118/84 98.1 °F (36.7 °C) Temporal 88 16 98 % 5' 8\" (1.727 m) 220 lb (99.8 kg)    Oxygen Saturation Interpretation: Normal.    Constitutional:  Alert, development consistent with age. HEENT:  NC/NT. PERRLA. Airway patent. Tympanic membranes normal in appearance bilaterally. Oropharynx unremarkable. Extraocular motions intact and painless. Neck:  Normal ROM. Supple. No meningeal signs , by lateral paravertebral tenderness and trapezius tenderness. Respiratory:  Clear to auscultation and breath sounds equal.  CV:  Regular rate and rhythm, normal heart sounds, without pathological murmurs, ectopy, gallops, or rubs. GI:  Abdomen Soft, nontender, good bowel sounds. No firm or pulsatile mass. Back:  No costovertebral tenderness. Extremities: No tenderness or edema noted. Integument:  Normal turgor.   Warm, dry, without visible rash, unless noted elsewhere. Lymphatics: No lymphangitis or adenopathy noted. Neurological:  Oriented x 3, GCS 15. CNII-XII grossly intact. Motor functions intact. Patellar, brachioradialis, triceps reflexes +2/4 bilaterally. No pronator drift, no past-pointing, rapid motions intact, clear speech, normal gait. Lab / Imaging Results   (All laboratory and radiology results have been personally reviewed by myself)  Labs:  No results found for this visit on 07/21/20. Imaging: All Radiology results interpreted by Radiologist unless otherwise noted. CT HEAD WO CONTRAST   Final Result   No indication for an acute intracranial process. No   significant changes since January 1, 2020 study. ED Course / Medical Decision Making     Medications   diazePAM (VALIUM) tablet 5 mg (5 mg Oral Given 7/21/20 2124)        Re-examination:  7/22/20       Time: Patient was sleeping and felt moderate improvement after Valium. None    Procedure(s):   none    MDM: Presents emergency with an occipital headache radiating down to his neck. No infectious symptoms, no meningeal signs. Patient has a history of arachnoid cyst and has headaches like this frequently. CT negative, neurologic exam normal.  Patient is advised to follow-up with his neurologist.  Patient reports he had been on some prescription headache medications but they became too expensive for him and so now he just uses over-the-counter. Patient advised to continue his over-the-counter medications 3-day supply of muscle relaxer provided. Patient advised not to drive while under the influence of the muscle relaxers. Consult(s):  Counseling: The emergency provider has spoken with the patient and discussed todays results, in addition to providing specific details for the plan of care and counseling regarding the diagnosis and prognosis. Questions are answered at this time and they are agreeable with the plan. Assessment      1.  Nonintractable episodic headache, unspecified headache type    2. Muscle spasms of neck      Plan   Discharge to home  Patient condition is good    New Medications     Discharge Medication List as of 7/21/2020 10:42 PM      START taking these medications    Details   diazePAM (VALIUM) 5 MG tablet Take 1 tablet by mouth every 6 hours as needed (muscle spasm) for up to 3 days. , Disp-12 tablet,R-0Print           Electronically signed by Caroline Silver PA-C   DD: 7/22/20  **This report was transcribed using voice recognition software. Every effort was made to ensure accuracy; however, inadvertent computerized transcription errors may be present.   END OF ED PROVIDER NOTE       Caroline Silver PA-C  07/22/20 [id number]

## 2020-07-22 NOTE — ED NOTES
Patient not driving, has someone with him who will be driving him home.   Will continue to monitor      Last Adams RN  07/21/20 2128

## 2021-06-08 ENCOUNTER — APPOINTMENT (OUTPATIENT)
Dept: CT IMAGING | Age: 26
End: 2021-06-08

## 2021-06-08 ENCOUNTER — HOSPITAL ENCOUNTER (EMERGENCY)
Age: 26
Discharge: HOME | End: 2021-06-08
Attending: EMERGENCY MEDICINE

## 2021-06-08 ENCOUNTER — APPOINTMENT (OUTPATIENT)
Dept: GENERAL RADIOLOGY | Age: 26
End: 2021-06-08

## 2021-06-08 VITALS
OXYGEN SATURATION: 98 % | WEIGHT: 235 LBS | DIASTOLIC BLOOD PRESSURE: 77 MMHG | HEART RATE: 88 BPM | BODY MASS INDEX: 35.61 KG/M2 | TEMPERATURE: 98.2 F | SYSTOLIC BLOOD PRESSURE: 138 MMHG | HEIGHT: 68 IN | RESPIRATION RATE: 16 BRPM

## 2021-06-08 DIAGNOSIS — R20.2 PARESTHESIAS: Primary | ICD-10-CM

## 2021-06-08 DIAGNOSIS — M54.32 SCIATICA OF LEFT SIDE: ICD-10-CM

## 2021-06-08 DIAGNOSIS — R07.9 CHEST PAIN, UNSPECIFIED TYPE: ICD-10-CM

## 2021-06-08 LAB
ANION GAP SERPL CALCULATED.3IONS-SCNC: 6 MMOL/L (ref 7–16)
BASOPHILS ABSOLUTE: 0.02 E9/L (ref 0–0.2)
BASOPHILS RELATIVE PERCENT: 0.3 % (ref 0–2)
BUN BLDV-MCNC: 9 MG/DL (ref 6–20)
CALCIUM SERPL-MCNC: 8.9 MG/DL (ref 8.6–10.2)
CHLORIDE BLD-SCNC: 106 MMOL/L (ref 98–107)
CO2: 26 MMOL/L (ref 22–29)
CREAT SERPL-MCNC: 0.9 MG/DL (ref 0.7–1.2)
D DIMER: 254 NG/ML DDU
EOSINOPHILS ABSOLUTE: 0.24 E9/L (ref 0.05–0.5)
EOSINOPHILS RELATIVE PERCENT: 3.3 % (ref 0–6)
GFR AFRICAN AMERICAN: >60
GFR NON-AFRICAN AMERICAN: >60 ML/MIN/1.73
GLUCOSE BLD-MCNC: 110 MG/DL (ref 74–99)
HCT VFR BLD CALC: 45.1 % (ref 37–54)
HEMOGLOBIN: 14.7 G/DL (ref 12.5–16.5)
IMMATURE GRANULOCYTES #: 0.01 E9/L
IMMATURE GRANULOCYTES %: 0.1 % (ref 0–5)
LYMPHOCYTES ABSOLUTE: 3.48 E9/L (ref 1.5–4)
LYMPHOCYTES RELATIVE PERCENT: 48.2 % (ref 20–42)
MCH RBC QN AUTO: 30.1 PG (ref 26–35)
MCHC RBC AUTO-ENTMCNC: 32.6 % (ref 32–34.5)
MCV RBC AUTO: 92.4 FL (ref 80–99.9)
MONOCYTES ABSOLUTE: 0.54 E9/L (ref 0.1–0.95)
MONOCYTES RELATIVE PERCENT: 7.5 % (ref 2–12)
NEUTROPHILS ABSOLUTE: 2.93 E9/L (ref 1.8–7.3)
NEUTROPHILS RELATIVE PERCENT: 40.6 % (ref 43–80)
PDW BLD-RTO: 12.9 FL (ref 11.5–15)
PLATELET # BLD: 241 E9/L (ref 130–450)
PMV BLD AUTO: 10 FL (ref 7–12)
POTASSIUM REFLEX MAGNESIUM: 4.4 MMOL/L (ref 3.5–5)
RBC # BLD: 4.88 E12/L (ref 3.8–5.8)
SODIUM BLD-SCNC: 138 MMOL/L (ref 132–146)
TROPONIN, HIGH SENSITIVITY: <6 NG/L (ref 0–11)
WBC # BLD: 7.2 E9/L (ref 4.5–11.5)

## 2021-06-08 PROCEDURE — 71275 CT ANGIOGRAPHY CHEST: CPT

## 2021-06-08 PROCEDURE — 99284 EMERGENCY DEPT VISIT MOD MDM: CPT

## 2021-06-08 PROCEDURE — 71045 X-RAY EXAM CHEST 1 VIEW: CPT

## 2021-06-08 PROCEDURE — 85025 COMPLETE CBC W/AUTO DIFF WBC: CPT

## 2021-06-08 PROCEDURE — 6360000002 HC RX W HCPCS: Performed by: EMERGENCY MEDICINE

## 2021-06-08 PROCEDURE — 2580000003 HC RX 258: Performed by: STUDENT IN AN ORGANIZED HEALTH CARE EDUCATION/TRAINING PROGRAM

## 2021-06-08 PROCEDURE — 84484 ASSAY OF TROPONIN QUANT: CPT

## 2021-06-08 PROCEDURE — 93005 ELECTROCARDIOGRAM TRACING: CPT | Performed by: STUDENT IN AN ORGANIZED HEALTH CARE EDUCATION/TRAINING PROGRAM

## 2021-06-08 PROCEDURE — 96374 THER/PROPH/DIAG INJ IV PUSH: CPT

## 2021-06-08 PROCEDURE — 80048 BASIC METABOLIC PNL TOTAL CA: CPT

## 2021-06-08 PROCEDURE — 85378 FIBRIN DEGRADE SEMIQUANT: CPT

## 2021-06-08 PROCEDURE — 6360000004 HC RX CONTRAST MEDICATION: Performed by: RADIOLOGY

## 2021-06-08 RX ORDER — 0.9 % SODIUM CHLORIDE 0.9 %
1000 INTRAVENOUS SOLUTION INTRAVENOUS ONCE
Status: COMPLETED | OUTPATIENT
Start: 2021-06-08 | End: 2021-06-08

## 2021-06-08 RX ORDER — KETOROLAC TROMETHAMINE 30 MG/ML
15 INJECTION, SOLUTION INTRAMUSCULAR; INTRAVENOUS ONCE
Status: COMPLETED | OUTPATIENT
Start: 2021-06-08 | End: 2021-06-08

## 2021-06-08 RX ADMIN — KETOROLAC TROMETHAMINE 15 MG: 30 INJECTION, SOLUTION INTRAMUSCULAR; INTRAVENOUS at 13:14

## 2021-06-08 RX ADMIN — IOPAMIDOL 75 ML: 755 INJECTION, SOLUTION INTRAVENOUS at 13:19

## 2021-06-08 RX ADMIN — SODIUM CHLORIDE 1000 ML: 9 INJECTION, SOLUTION INTRAVENOUS at 13:13

## 2021-06-08 ASSESSMENT — ENCOUNTER SYMPTOMS
VOMITING: 0
DIARRHEA: 0
SINUS PRESSURE: 0
EYE PAIN: 0
SHORTNESS OF BREATH: 0
SORE THROAT: 0
EYE DISCHARGE: 0
COUGH: 0
WHEEZING: 0
CHEST TIGHTNESS: 1
BACK PAIN: 1
NAUSEA: 0
ABDOMINAL PAIN: 0

## 2021-06-08 ASSESSMENT — PAIN DESCRIPTION - PAIN TYPE: TYPE: ACUTE PAIN

## 2021-06-08 ASSESSMENT — PAIN SCALES - GENERAL: PAINLEVEL_OUTOF10: 7

## 2021-06-08 NOTE — ED PROVIDER NOTES
Patient is a 78-year-old male presenting to emergency department for hip pain is been ongoing for 3 days with shooting in his left hip. However he is not concerned about that today, as he says he has a history of back pain, and believes he might have sciatica, does not want any imaging performed on that hip. The reason why he is primarily here as he is concerned about chest pain. He has had some chest tightness on the left side of his chest today, associated with some left arm numbness and tingling. Onset of symptoms were today, they have been persistent, 7 out of 10 in severity, feels tight in nature, is persistent, nothing makes it better or worse. Review of Systems   Constitutional: Negative for chills and fever. HENT: Negative for ear pain, sinus pressure and sore throat. Eyes: Negative for pain and discharge. Respiratory: Positive for chest tightness. Negative for cough, shortness of breath and wheezing. Cardiovascular: Negative for chest pain. Gastrointestinal: Negative for abdominal pain, diarrhea, nausea and vomiting. Genitourinary: Negative for dysuria and frequency. Musculoskeletal: Positive for back pain (Chronic). Negative for arthralgias. Skin: Negative for rash and wound. Neurological: Positive for numbness (Numbness in left arm, chest tightness). Negative for weakness and headaches. Hematological: Negative for adenopathy. All other systems reviewed and are negative. Physical Exam  Vitals and nursing note reviewed. Constitutional:       Appearance: Normal appearance. He is well-developed. HENT:      Head: Normocephalic and atraumatic. Eyes:      Conjunctiva/sclera: Conjunctivae normal.   Cardiovascular:      Rate and Rhythm: Normal rate and regular rhythm. Pulses: Normal pulses. Heart sounds: Normal heart sounds. No murmur heard. Pulmonary:      Effort: Pulmonary effort is normal. No respiratory distress.       Breath sounds: Normal breath sounds. No wheezing or rales. Abdominal:      General: Abdomen is flat. Bowel sounds are normal.      Palpations: Abdomen is soft. Tenderness: There is no abdominal tenderness. There is no guarding or rebound. Musculoskeletal:         General: No tenderness or deformity. Cervical back: Normal range of motion and neck supple. Skin:     General: Skin is warm and dry. Capillary Refill: Capillary refill takes less than 2 seconds. Neurological:      Mental Status: He is alert and oriented to person, place, and time. Cranial Nerves: No cranial nerve deficit. Sensory: No sensory deficit. Motor: No weakness. Procedures     MDM     ED Course as of Jun 08 1550 Tue Jun 08, 2021   1233 EKG: This EKG is signed by emergency department physician. Rate: 85  Rhythm: Sinus  Interpretation: no acute changes  Comparison: was normal         [JG]   1310 Troponin less than 6, D-dimer elevated, will obtain CT scan, give fluids as well. [JG]   1359 No PE on CTA, troponin less than 6, EKG normal.  Will discharge patient, instructed follow-up with primary care physician, return precautions discussed. [JG]      ED Course User Index  [JG] Gal Cyr MD        Patient presented emergency department for hip pain and ongoing for 3 days. He did not want this worked up as he says he has a history of back pain believes he might have sciatica. Patient for chest pain as he had left-sided chest discomfort and arm numbness. EKG, troponin, CT scans were negative. Patient was discharged home, return precautions given. ED Course as of Jun 08 1550   Tue Jun 08, 2021   1233 EKG: This EKG is signed by emergency department physician. Rate: 85  Rhythm: Sinus  Interpretation: no acute changes  Comparison: was normal         [JG]   1310 Troponin less than 6, D-dimer elevated, will obtain CT scan, give fluids as well.     [JG]   1359 No PE on CTA, troponin less than 6, EKG normal. Will discharge patient, instructed follow-up with primary care physician, return precautions discussed. [JG]      ED Course User Index  [PRATIMAG] China Roe MD       --------------------------------------------- PAST HISTORY ---------------------------------------------  Past Medical History:  has a past medical history of Gallstones, GERD (gastroesophageal reflux disease), Hyperlipidemia, Hypertension, Intracranial arachnoid cyst, and Tobacco abuse. Past Surgical History:  has a past surgical history that includes Tympanostomy tube placement; Upper gastrointestinal endoscopy (N/A, 3/25/2019); and Cholecystectomy, laparoscopic (N/A, 4/19/2019). Social History:  reports that he has been smoking cigarettes. He started smoking about 12 years ago. He has a 17.00 pack-year smoking history. He has never used smokeless tobacco. He reports current alcohol use. He reports that he does not use drugs. Family History: family history includes ADHD in his brother; COPD in his maternal grandmother; Cancer in his maternal grandfather; Down Syndrome in his maternal aunt; High Blood Pressure in his maternal aunt, maternal aunt, and maternal grandmother; High Cholesterol in his maternal grandmother; Other in his father, maternal grandmother, paternal grandmother, and sister; Seizures in his paternal grandfather; Thyroid Disease in his paternal aunt and paternal uncle. The patients home medications have been reviewed. Allergies: Patient has no known allergies.     -------------------------------------------------- RESULTS -------------------------------------------------  Labs:  Results for orders placed or performed during the hospital encounter of 06/08/21   CBC Auto Differential   Result Value Ref Range    WBC 7.2 4.5 - 11.5 E9/L    RBC 4.88 3.80 - 5.80 E12/L    Hemoglobin 14.7 12.5 - 16.5 g/dL    Hematocrit 45.1 37.0 - 54.0 %    MCV 92.4 80.0 - 99.9 fL    MCH 30.1 26.0 - 35.0 pg    MCHC 32.6 32.0 - 34.5 %    RDW Wt 235 lb (106.6 kg)   SpO2 98%   BMI 35.73 kg/m²   Oxygen Saturation Interpretation: Normal      ------------------------------------------ PROGRESS NOTES ------------------------------------------  3:50 PM EDT  I have spoken with the patient and discussed todays results, in addition to providing specific details for the plan of care and counseling regarding the diagnosis and prognosis. Their questions are answered at this time and they are agreeable with the plan. I discussed at length with them reasons for immediate return here for re evaluation. They will followup with their primary care physician by calling their office tomorrow. --------------------------------- ADDITIONAL PROVIDER NOTES ---------------------------------  At this time the patient is without objective evidence of an acute process requiring hospitalization or inpatient management. They have remained hemodynamically stable throughout their entire ED visit and are stable for discharge with outpatient follow-up. The plan has been discussed in detail and they are aware of the specific conditions for emergent return, as well as the importance of follow-up. Discharge Medication List as of 6/8/2021  2:00 PM          Diagnosis:  1. Paresthesias    2. Sciatica of left side    3. Chest pain, unspecified type        Disposition:  Patient's disposition: Discharge to home  Patient's condition is stable.          Prem Berry MD  Resident  06/08/21 7899

## 2021-06-09 LAB
EKG ATRIAL RATE: 85 BPM
EKG P AXIS: 54 DEGREES
EKG P-R INTERVAL: 130 MS
EKG Q-T INTERVAL: 354 MS
EKG QRS DURATION: 82 MS
EKG QTC CALCULATION (BAZETT): 421 MS
EKG R AXIS: 35 DEGREES
EKG T AXIS: 35 DEGREES
EKG VENTRICULAR RATE: 85 BPM

## 2021-06-09 PROCEDURE — 93010 ELECTROCARDIOGRAM REPORT: CPT | Performed by: INTERNAL MEDICINE

## 2021-07-04 ENCOUNTER — APPOINTMENT (OUTPATIENT)
Dept: CT IMAGING | Age: 26
End: 2021-07-04

## 2021-07-04 ENCOUNTER — HOSPITAL ENCOUNTER (EMERGENCY)
Age: 26
Discharge: HOME | End: 2021-07-04

## 2021-07-04 VITALS
TEMPERATURE: 98.1 F | HEART RATE: 79 BPM | BODY MASS INDEX: 36.07 KG/M2 | HEIGHT: 68 IN | OXYGEN SATURATION: 96 % | RESPIRATION RATE: 18 BRPM | WEIGHT: 238 LBS | DIASTOLIC BLOOD PRESSURE: 60 MMHG | SYSTOLIC BLOOD PRESSURE: 117 MMHG

## 2021-07-04 DIAGNOSIS — K52.9 COLITIS: ICD-10-CM

## 2021-07-04 DIAGNOSIS — R10.31 RIGHT LOWER QUADRANT ABDOMINAL PAIN: Primary | ICD-10-CM

## 2021-07-04 LAB
ALBUMIN SERPL-MCNC: 4.1 G/DL (ref 3.5–5.2)
ALP BLD-CCNC: 65 U/L (ref 40–129)
ALT SERPL-CCNC: 23 U/L (ref 0–40)
ANION GAP SERPL CALCULATED.3IONS-SCNC: 7 MMOL/L (ref 7–16)
AST SERPL-CCNC: 17 U/L (ref 0–39)
BASOPHILS ABSOLUTE: 0.03 E9/L (ref 0–0.2)
BASOPHILS RELATIVE PERCENT: 0.2 % (ref 0–2)
BILIRUB SERPL-MCNC: 0.5 MG/DL (ref 0–1.2)
BILIRUBIN URINE: NEGATIVE
BLOOD, URINE: NEGATIVE
BUN BLDV-MCNC: 11 MG/DL (ref 6–20)
CALCIUM SERPL-MCNC: 8.6 MG/DL (ref 8.6–10.2)
CHLORIDE BLD-SCNC: 104 MMOL/L (ref 98–107)
CLARITY: CLEAR
CO2: 26 MMOL/L (ref 22–29)
COLOR: YELLOW
CREAT SERPL-MCNC: 0.9 MG/DL (ref 0.7–1.2)
EOSINOPHILS ABSOLUTE: 0.3 E9/L (ref 0.05–0.5)
EOSINOPHILS RELATIVE PERCENT: 2.3 % (ref 0–6)
GFR AFRICAN AMERICAN: >60
GFR NON-AFRICAN AMERICAN: >60 ML/MIN/1.73
GLUCOSE BLD-MCNC: 96 MG/DL (ref 74–99)
GLUCOSE URINE: NEGATIVE MG/DL
HCT VFR BLD CALC: 46.6 % (ref 37–54)
HEMOGLOBIN: 15.3 G/DL (ref 12.5–16.5)
IMMATURE GRANULOCYTES #: 0.03 E9/L
IMMATURE GRANULOCYTES %: 0.2 % (ref 0–5)
KETONES, URINE: NEGATIVE MG/DL
LACTIC ACID: 0.7 MMOL/L (ref 0.5–2.2)
LEUKOCYTE ESTERASE, URINE: NEGATIVE
LIPASE: 21 U/L (ref 13–60)
LYMPHOCYTES ABSOLUTE: 3.23 E9/L (ref 1.5–4)
LYMPHOCYTES RELATIVE PERCENT: 24.5 % (ref 20–42)
MCH RBC QN AUTO: 30.3 PG (ref 26–35)
MCHC RBC AUTO-ENTMCNC: 32.8 % (ref 32–34.5)
MCV RBC AUTO: 92.3 FL (ref 80–99.9)
MONOCYTES ABSOLUTE: 0.72 E9/L (ref 0.1–0.95)
MONOCYTES RELATIVE PERCENT: 5.5 % (ref 2–12)
NEUTROPHILS ABSOLUTE: 8.89 E9/L (ref 1.8–7.3)
NEUTROPHILS RELATIVE PERCENT: 67.3 % (ref 43–80)
NITRITE, URINE: NEGATIVE
PDW BLD-RTO: 12.5 FL (ref 11.5–15)
PH UA: 5.5 (ref 5–9)
PLATELET # BLD: 244 E9/L (ref 130–450)
PMV BLD AUTO: 10.3 FL (ref 7–12)
POTASSIUM REFLEX MAGNESIUM: 4.1 MMOL/L (ref 3.5–5)
PROTEIN UA: NEGATIVE MG/DL
RBC # BLD: 5.05 E12/L (ref 3.8–5.8)
SODIUM BLD-SCNC: 137 MMOL/L (ref 132–146)
SPECIFIC GRAVITY UA: 1.02 (ref 1–1.03)
TOTAL PROTEIN: 6.8 G/DL (ref 6.4–8.3)
UROBILINOGEN, URINE: 1 E.U./DL
WBC # BLD: 13.2 E9/L (ref 4.5–11.5)

## 2021-07-04 PROCEDURE — 6360000002 HC RX W HCPCS: Performed by: PHYSICIAN ASSISTANT

## 2021-07-04 PROCEDURE — 96374 THER/PROPH/DIAG INJ IV PUSH: CPT

## 2021-07-04 PROCEDURE — 6360000004 HC RX CONTRAST MEDICATION: Performed by: RADIOLOGY

## 2021-07-04 PROCEDURE — 80053 COMPREHEN METABOLIC PANEL: CPT

## 2021-07-04 PROCEDURE — 36415 COLL VENOUS BLD VENIPUNCTURE: CPT

## 2021-07-04 PROCEDURE — 85025 COMPLETE CBC W/AUTO DIFF WBC: CPT

## 2021-07-04 PROCEDURE — 2580000003 HC RX 258: Performed by: PHYSICIAN ASSISTANT

## 2021-07-04 PROCEDURE — 81003 URINALYSIS AUTO W/O SCOPE: CPT

## 2021-07-04 PROCEDURE — 99284 EMERGENCY DEPT VISIT MOD MDM: CPT

## 2021-07-04 PROCEDURE — 74177 CT ABD & PELVIS W/CONTRAST: CPT

## 2021-07-04 PROCEDURE — 2580000003 HC RX 258: Performed by: RADIOLOGY

## 2021-07-04 PROCEDURE — 83690 ASSAY OF LIPASE: CPT

## 2021-07-04 PROCEDURE — 96375 TX/PRO/DX INJ NEW DRUG ADDON: CPT

## 2021-07-04 PROCEDURE — 83605 ASSAY OF LACTIC ACID: CPT

## 2021-07-04 RX ORDER — METOCLOPRAMIDE HYDROCHLORIDE 5 MG/ML
10 INJECTION INTRAMUSCULAR; INTRAVENOUS ONCE
Status: COMPLETED | OUTPATIENT
Start: 2021-07-04 | End: 2021-07-04

## 2021-07-04 RX ORDER — 0.9 % SODIUM CHLORIDE 0.9 %
1000 INTRAVENOUS SOLUTION INTRAVENOUS ONCE
Status: COMPLETED | OUTPATIENT
Start: 2021-07-04 | End: 2021-07-04

## 2021-07-04 RX ORDER — ONDANSETRON 2 MG/ML
4 INJECTION INTRAMUSCULAR; INTRAVENOUS ONCE
Status: COMPLETED | OUTPATIENT
Start: 2021-07-04 | End: 2021-07-04

## 2021-07-04 RX ORDER — ONDANSETRON 4 MG/1
4 TABLET, ORALLY DISINTEGRATING ORAL EVERY 8 HOURS PRN
Qty: 10 TABLET | Refills: 0 | Status: SHIPPED | OUTPATIENT
Start: 2021-07-04

## 2021-07-04 RX ORDER — FENTANYL CITRATE 50 UG/ML
50 INJECTION, SOLUTION INTRAMUSCULAR; INTRAVENOUS ONCE
Status: DISCONTINUED | OUTPATIENT
Start: 2021-07-04 | End: 2021-07-04 | Stop reason: HOSPADM

## 2021-07-04 RX ORDER — DIPHENHYDRAMINE HYDROCHLORIDE 50 MG/ML
25 INJECTION INTRAMUSCULAR; INTRAVENOUS ONCE
Status: COMPLETED | OUTPATIENT
Start: 2021-07-04 | End: 2021-07-04

## 2021-07-04 RX ORDER — MORPHINE SULFATE 4 MG/ML
4 INJECTION, SOLUTION INTRAMUSCULAR; INTRAVENOUS ONCE
Status: COMPLETED | OUTPATIENT
Start: 2021-07-04 | End: 2021-07-04

## 2021-07-04 RX ORDER — DICYCLOMINE HYDROCHLORIDE 10 MG/1
20 CAPSULE ORAL 4 TIMES DAILY PRN
Qty: 20 CAPSULE | Refills: 0 | Status: SHIPPED | OUTPATIENT
Start: 2021-07-04

## 2021-07-04 RX ORDER — SODIUM CHLORIDE 0.9 % (FLUSH) 0.9 %
10 SYRINGE (ML) INJECTION PRN
Status: COMPLETED | OUTPATIENT
Start: 2021-07-04 | End: 2021-07-04

## 2021-07-04 RX ADMIN — Medication 10 ML: at 12:20

## 2021-07-04 RX ADMIN — DIPHENHYDRAMINE HYDROCHLORIDE 25 MG: 50 INJECTION INTRAMUSCULAR; INTRAVENOUS at 12:07

## 2021-07-04 RX ADMIN — ONDANSETRON 4 MG: 2 INJECTION INTRAMUSCULAR; INTRAVENOUS at 11:32

## 2021-07-04 RX ADMIN — IOPAMIDOL 75 ML: 755 INJECTION, SOLUTION INTRAVENOUS at 12:22

## 2021-07-04 RX ADMIN — METOCLOPRAMIDE 10 MG: 5 INJECTION, SOLUTION INTRAMUSCULAR; INTRAVENOUS at 12:07

## 2021-07-04 RX ADMIN — SODIUM CHLORIDE 1000 ML: 9 INJECTION, SOLUTION INTRAVENOUS at 11:30

## 2021-07-04 RX ADMIN — MORPHINE SULFATE 4 MG: 4 INJECTION, SOLUTION INTRAMUSCULAR; INTRAVENOUS at 11:32

## 2021-07-04 ASSESSMENT — ENCOUNTER SYMPTOMS
COUGH: 0
VOMITING: 0
CHEST TIGHTNESS: 0
BACK PAIN: 0
COLOR CHANGE: 0
CONSTIPATION: 0
SHORTNESS OF BREATH: 0
DIARRHEA: 1
NAUSEA: 1
ABDOMINAL PAIN: 1

## 2021-07-04 ASSESSMENT — PAIN SCALES - GENERAL
PAINLEVEL_OUTOF10: 8
PAINLEVEL_OUTOF10: 4
PAINLEVEL_OUTOF10: 8

## 2021-07-04 NOTE — ED PROVIDER NOTES
[facility]        Department of Emergency Medicine   ED  Provider Note  Admit Date/RoomTime: 7/4/2021 10:19 AM  ED Room: 18/18  HPI:  7/4/21, Time: 12:01 PM EDT      The patient is a 58-year-old male with a history of HTN, HLD, GERD, cholecystectomy presenting to the emergency department with right lower quadrant abdominal pain that radiates across his lower abdomen. Patient states this pain woke him up out of his sleep at 0300. He states that he is also having nausea and diarrhea. The pain has progressively gotten worse since it started. He describes it as a sharp stabbing pain. He has never had pain like this before. He is not taking anything for the pain. He denies any additional surgeries to the abdomen. He also denies any fevers or chills, blood in the stool, vomiting, dysuria, hematuria, flank pain, chest pain or shortness of breath. The history is provided by the patient. No  was used. REVIEW OF SYSTEMS:  Review of Systems   Constitutional: Negative for activity change, chills, fatigue and fever. Respiratory: Negative for cough, chest tightness and shortness of breath. Cardiovascular: Negative for chest pain, palpitations and leg swelling. Gastrointestinal: Positive for abdominal pain, diarrhea and nausea. Negative for constipation and vomiting. Genitourinary: Negative for dysuria, flank pain, frequency and hematuria. Musculoskeletal: Negative for back pain, neck pain and neck stiffness. Skin: Negative for color change, pallor and rash. Neurological: Negative for dizziness, light-headedness and headaches. Psychiatric/Behavioral: Negative for agitation, behavioral problems and confusion.       Pertinent positives and negatives are stated within HPI, all other systems reviewed and are negative.      --------------------------------------------- PAST HISTORY ---------------------------------------------  Past Medical History:  has a past medical history of Gallstones, GERD (gastroesophageal reflux disease), Hyperlipidemia, Hypertension, Intracranial arachnoid cyst, and Tobacco abuse. Past Surgical History:  has a past surgical history that includes Tympanostomy tube placement; Upper gastrointestinal endoscopy (N/A, 3/25/2019); and Cholecystectomy, laparoscopic (N/A, 4/19/2019). Social History:  reports that he has been smoking cigarettes. He started smoking about 12 years ago. He has a 17.00 pack-year smoking history. He has never used smokeless tobacco. He reports current alcohol use. He reports that he does not use drugs. Family History: family history includes ADHD in his brother; COPD in his maternal grandmother; Cancer in his maternal grandfather; Down Syndrome in his maternal aunt; High Blood Pressure in his maternal aunt, maternal aunt, and maternal grandmother; High Cholesterol in his maternal grandmother; Other in his father, maternal grandmother, paternal grandmother, and sister; Seizures in his paternal grandfather; Thyroid Disease in his paternal aunt and paternal uncle. The patients home medications have been reviewed. Allergies: Patient has no known allergies.     -------------------------------------------------- RESULTS -------------------------------------------------  All laboratory and radiology results have been personally reviewed by myself   LABS:  Results for orders placed or performed during the hospital encounter of 07/04/21   CBC Auto Differential   Result Value Ref Range    WBC 13.2 (H) 4.5 - 11.5 E9/L    RBC 5.05 3.80 - 5.80 E12/L    Hemoglobin 15.3 12.5 - 16.5 g/dL    Hematocrit 46.6 37.0 - 54.0 %    MCV 92.3 80.0 - 99.9 fL    MCH 30.3 26.0 - 35.0 pg    MCHC 32.8 32.0 - 34.5 %    RDW 12.5 11.5 - 15.0 fL    Platelets 876 807 - 292 E9/L    MPV 10.3 7.0 - 12.0 fL    Neutrophils % 67.3 43.0 - 80.0 %    Immature Granulocytes % 0.2 0.0 - 5.0 %    Lymphocytes % 24.5 20.0 - 42.0 %    Monocytes % 5.5 2.0 - 12.0 % ------------------------------ ED COURSE/MEDICAL DECISION MAKING----------------------  Medications   fentaNYL (SUBLIMAZE) injection 50 mcg (50 mcg Intravenous Not Given 7/4/21 1251)   0.9 % sodium chloride bolus (0 mLs Intravenous Stopped 7/4/21 1248)   ondansetron (ZOFRAN) injection 4 mg (4 mg Intravenous Given 7/4/21 1132)   morphine sulfate (PF) injection 4 mg (4 mg Intravenous Given 7/4/21 1132)   diphenhydrAMINE (BENADRYL) injection 25 mg (25 mg Intravenous Given 7/4/21 1207)   metoclopramide (REGLAN) injection 10 mg (10 mg Intravenous Given 7/4/21 1207)   iopamidol (ISOVUE-370) 76 % injection 75 mL (75 mLs Intravenous Given 7/4/21 1222)   sodium chloride flush 0.9 % injection 10 mL (10 mLs Intravenous Given 7/4/21 1220)         ED COURSE:      CT ABDOMEN PELVIS W IV CONTRAST Additional Contrast? None   Final Result   No acute intraperitoneal retroperitoneal process in the abdomen or in the   pelvis. Procedures:  Procedures     Medical Decision Making:   MDM   73-year-old male presenting to ED with right lower quadrant abdominal pain, nausea and diarrhea 0300. Upon arrival patient is afebrile and hemodynamically stable. He does have a mild leukocytosis but labs are otherwise unremarkable. CT shows no obvious acute appendicitis or other acute intra-abdominal process. There is mild dilatation of the jejunum with some mesenteric adenopathy but nothing significant. No signs of diverticulitis. This may be a mild colitis versus viral. Patient will be treated with Bentyl and Zofran at home. He is encouraged to follow-up with primary care physician given a low threshold for returning to the emergency department any new or worsening symptoms. Counseling: The emergency provider has spoken with the patient and discussed todays results, in addition to providing specific details for the plan of care and counseling regarding the diagnosis and prognosis.   Questions are answered at this time and they are agreeable with the plan.      --------------------------------- IMPRESSION AND DISPOSITION ---------------------------------    IMPRESSION  1. Right lower quadrant abdominal pain    2. Colitis        DISPOSITION  Disposition: Discharge to home  Patient condition is good      Electronically signed by Moises Mendenhall PA-C   DD: 7/4/21  **This report was transcribed using voice recognition software. Every effort was made to ensure accuracy; however, inadvertent computerized transcription errors may be present.   END OF ED PROVIDER NOTE         Moises Mendenhall PA-C  07/04/21 4725

## 2021-07-04 NOTE — LETTER
[facility] Emergency Department  [address] [place] [id number]  Phone: [phone]               July 4, 2021    Patient: Darrius Orozco   YOB: 1995   Date of Visit: 7/4/2021       To Whom It May Concern:    Miroslava Casillas was seen and treated in our emergency department on 7/4/2021. He may return to work 7/5/21.       Sincerely,                Signature:__________________________________

## 2021-08-11 ENCOUNTER — OFFICE VISIT (OUTPATIENT)
Dept: PRIMARY CARE CLINIC | Age: 26
End: 2021-08-11

## 2021-08-11 VITALS
WEIGHT: 243 LBS | SYSTOLIC BLOOD PRESSURE: 122 MMHG | HEART RATE: 86 BPM | RESPIRATION RATE: 18 BRPM | OXYGEN SATURATION: 98 % | DIASTOLIC BLOOD PRESSURE: 76 MMHG | TEMPERATURE: 98.1 F | BODY MASS INDEX: 36.95 KG/M2

## 2021-08-11 DIAGNOSIS — Z00.01 ENCOUNTER FOR WELL ADULT EXAM WITH ABNORMAL FINDINGS: Primary | ICD-10-CM

## 2021-08-11 DIAGNOSIS — F32.A DEPRESSION, UNSPECIFIED DEPRESSION TYPE: ICD-10-CM

## 2021-08-11 DIAGNOSIS — Z20.2 EXPOSURE TO STD: ICD-10-CM

## 2021-08-11 PROCEDURE — 99395 PREV VISIT EST AGE 18-39: CPT | Performed by: FAMILY MEDICINE

## 2021-08-11 RX ORDER — CITALOPRAM 20 MG/1
20 TABLET ORAL DAILY
Qty: 30 TABLET | Refills: 5 | Status: SHIPPED | OUTPATIENT
Start: 2021-08-11 | End: 2022-02-14

## 2021-08-11 ASSESSMENT — ENCOUNTER SYMPTOMS
SORE THROAT: 0
ALLERGIC/IMMUNOLOGIC NEGATIVE: 1
VOMITING: 0
DIARRHEA: 0
ABDOMINAL PAIN: 0
NAUSEA: 0
BACK PAIN: 0
FACIAL SWELLING: 0
SHORTNESS OF BREATH: 0
WHEEZING: 0
SINUS PRESSURE: 0
COLOR CHANGE: 0
COUGH: 0
BLOOD IN STOOL: 0
APNEA: 0
PHOTOPHOBIA: 0
CHEST TIGHTNESS: 0

## 2021-08-11 NOTE — PROGRESS NOTES
Chief Complaint:   Chief Complaint   Patient presents with    Annual Exam    Depression       Mental Health Problem  The primary symptoms include dysphoric mood. This is a new problem. The degree of incapacity that he is experiencing as a consequence of his illness is mild. Additional symptoms of the illness do not include agitation, headaches or abdominal pain. He does not admit to suicidal ideas. He does not have a plan to attempt suicide. He does not contemplate harming himself. He has not already injured self. He does not contemplate injuring another person. He has not already  injured another person.    doing well   Needs  for pre employment    Patient Active Problem List   Diagnosis    Tobacco abuse    Intracranial arachnoid cyst    GERD (gastroesophageal reflux disease)    Hyperlipidemia    Epigastric pain    Symptomatic cholelithiasis       Past Medical History:   Diagnosis Date    Gallstones     for OR 4-19-19     GERD (gastroesophageal reflux disease)     Hyperlipidemia     was on medication in past, age 10 on zocor    Hypertension     never on meds, dxed by last PCP    Intracranial arachnoid cyst 05/17/2016    per CT 5/17/16-Left posterior cranial fossa- monitored by Dr. June Gaming abuse        Past Surgical History:   Procedure Laterality Date    CHOLECYSTECTOMY, LAPAROSCOPIC N/A 4/19/2019    CHOLECYSTECTOMY LAPAROSCOPIC POSS OPEN POSS CHOLANGIOGRAM performed by Molinda Nissen, DO at [facility] OR    TYMPANOSTOMY TUBE PLACEMENT      UPPER GASTROINTESTINAL ENDOSCOPY N/A 3/25/2019    EGD BIOPSY performed by Molinda Nissen, DO at [facility] ENDOSCOPY       Current Outpatient Medications   Medication Sig Dispense Refill    citalopram (CELEXA) 20 MG tablet Take 1 tablet by mouth daily 30 tablet 5    dicyclomine (BENTYL) 10 MG capsule Take 2 capsules by mouth 4 times daily as needed (abdominal pain and cramping) 20 capsule none    ondansetron (ZOFRAN ODT) 4 MG disintegrating tablet Take 1 tablet by mouth every 8 hours as needed for Nausea or Vomiting 10 tablet 0    albuterol sulfate HFA (VENTOLIN HFA) 108 (90 Base) MCG/ACT inhaler Inhale 2 puffs into the lungs every 6 hours as needed for Wheezing 1 Inhaler 3     No current facility-administered medications for this visit. 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   Tobacco Use    Smoking status: Current Every Day Smoker     Packs/day: 1.00     Years: 17.00     Pack years: 17.00     Types: Cigarettes     Start date: 1/1/2009    Smokeless tobacco: Never Used    Tobacco comment: started age 10 and smoked up to a pack a day   Vaping Use    Vaping Use: Never used   Substance and Sexual Activity    Alcohol use: Yes     Alcohol/week: 0.0 standard drinks     Comment: Occasionally-socially    Drug use: No    Sexual activity: Yes     Partners: Male     Comment: Identifies as carranza   Other Topics Concern    Not on file   Social History Narrative    Lives alone     Social Determinants of Health     Financial Resource Strain:     Difficulty of Paying Living Expenses:    Food Insecurity:     Worried About Running Out of Food in the Last Year:     [address] in the Last Year:    Transportation Needs:     Lack of Transportation (Medical):      Lack of Transportation (Non-Medical):    Physical Activity:     Days of Exercise per Week:     Minutes of Exercise per Session:    Stress:     Feeling of Stress :    Social Connections:     Frequency of Communication with Friends and Family:     Frequency of Social Gatherings with Friends and Family:     Attends Sabianist Services:     Active Member of Clubs or Organizations:     Attends Club or Organization Meetings:     Marital Status:    Intimate Partner Violence:     Fear of Current or Ex-Partner:     Emotionally Abused:     Physically Abused:     Sexually Abused:        Family History   Problem Relation Age of Onset    Other Father         motorcycle accident    Other Sister     ADHD Brother     Down Syndrome Maternal Aunt     Thyroid Disease Paternal Aunt     Thyroid Disease Paternal Uncle     Other Maternal Grandmother         Pulmonary Emboli    High Blood Pressure Maternal Grandmother     High Cholesterol Maternal Grandmother     COPD Maternal Grandmother     Cancer Maternal Grandfather         prostate    Other Paternal Grandmother         blood clots in legs    Seizures Paternal Grandfather     High Blood Pressure Maternal Aunt     High Blood Pressure Maternal Aunt          Review of Systems   Constitutional: Negative. HENT: Negative for congestion, facial swelling, hearing loss, nosebleeds, sinus pressure and sore throat. Eyes: Negative for photophobia and visual disturbance. Respiratory: Negative for apnea, cough, chest tightness, shortness of breath and wheezing. Cardiovascular: Negative for chest pain, palpitations and leg swelling. Gastrointestinal: Negative for abdominal pain, blood in stool, diarrhea, nausea and vomiting. Genitourinary: Negative for difficulty urinating, frequency and urgency. Musculoskeletal: Negative for arthralgias, back pain, joint swelling and myalgias. Skin: Negative for color change and rash. Allergic/Immunologic: Negative. Neurological: Negative for syncope, weakness, light-headedness and headaches. Hematological: Negative. Psychiatric/Behavioral: Positive for dysphoric mood. Negative for agitation, behavioral problems, confusion and self-injury. The patient is not nervous/anxious. All other systems reviewed and are negative. Physical Exam  Vitals and nursing note reviewed.    Constitutional: General: He is not in acute distress. Appearance: He is well-developed. HENT:      Head: Normocephalic and atraumatic. Nose: Nose normal.   Eyes:      Conjunctiva/sclera: Conjunctivae normal.      Pupils: Pupils are equal, round, and reactive to light. Neck:      Thyroid: No thyromegaly. Vascular: No JVD. Cardiovascular:      Rate and Rhythm: Normal rate and regular rhythm. Heart sounds: Normal heart sounds. No murmur heard. No friction rub. No gallop. Pulmonary:      Effort: Pulmonary effort is normal. No respiratory distress. Breath sounds: Normal breath sounds. No wheezing. Abdominal:      General: Bowel sounds are normal. There is no distension. Palpations: Abdomen is soft. Tenderness: There is no abdominal tenderness. There is no guarding or rebound. Musculoskeletal:         General: Normal range of motion. Cervical back: Normal range of motion and neck supple. Lymphadenopathy:      Cervical: No cervical adenopathy. Skin:     General: Skin is warm and dry. Findings: No erythema or rash. Neurological:      Mental Status: He is alert and oriented to person, place, and time. Cranial Nerves: No cranial nerve deficit. Motor: No abnormal muscle tone. Coordination: Coordination normal.      Deep Tendon Reflexes: Reflexes are normal and symmetric. Psychiatric:         Mood and Affect: Mood is depressed. Behavior: Behavior normal.         Judgment: Judgment normal.                               ASSESSMENT/PLAN:    Giorgio was seen today for annual exam and depression. Diagnoses and all orders for this visit:    Encounter for well adult exam with abnormal findings  -     Measles Immune IgG; Future  -     Mumps Antibody, IgG; Future  -     Rubella; Future  -     HEPATITIS B SURFACE ANTIBODY; Future  -     VARICELLA ZOSTER ANTIBODY, IGG; Future  -     HIV-1 AND HIV-2 ANTIBODIES; Future  -     RPR;  Future  -     C.TRACHOMATIS LEONIE Messer; Future    Depression, unspecified depression type  -     citalopram (CELEXA) 20 MG tablet; Take 1 tablet by mouth daily    Exposure to STD  -     HIV-1 AND HIV-2 ANTIBODIES; Future  -     RPR; Future  -     C.TRACHOMATIS Manisha Messer;  Future            Merry Marker, DO    8/11/2021  4:03 PM

## 2021-09-15 ENCOUNTER — PATIENT MESSAGE (OUTPATIENT)
Dept: PRIMARY CARE CLINIC | Age: 26
End: 2021-09-15

## 2021-09-16 NOTE — TELEPHONE ENCOUNTER
From: Krishan Fuentes  To:  Alberto Stewart DO  Sent: 9/15/2021 10:08 PM EDT  Subject: Prescription Question    Is there anything you could prescribe to help me quit smoking

## 2021-09-17 RX ORDER — NICOTINE 21 MG/24HR
1 PATCH, TRANSDERMAL 24 HOURS TRANSDERMAL DAILY
Qty: 42 PATCH | Refills: 0 | Status: SHIPPED | OUTPATIENT
Start: 2021-09-17 | End: 2021-10-29

## 2022-02-14 ENCOUNTER — OFFICE VISIT (OUTPATIENT)
Dept: PRIMARY CARE CLINIC | Age: 27
End: 2022-02-14
Payer: COMMERCIAL

## 2022-02-14 VITALS
TEMPERATURE: 97.1 F | DIASTOLIC BLOOD PRESSURE: 84 MMHG | RESPIRATION RATE: 18 BRPM | OXYGEN SATURATION: 98 % | SYSTOLIC BLOOD PRESSURE: 132 MMHG | HEIGHT: 68 IN | BODY MASS INDEX: 38.34 KG/M2 | HEART RATE: 83 BPM | WEIGHT: 253 LBS

## 2022-02-14 DIAGNOSIS — E78.5 HYPERLIPIDEMIA, UNSPECIFIED HYPERLIPIDEMIA TYPE: ICD-10-CM

## 2022-02-14 DIAGNOSIS — K58.1 IRRITABLE BOWEL SYNDROME WITH CONSTIPATION: ICD-10-CM

## 2022-02-14 DIAGNOSIS — R42 DIZZINESS: ICD-10-CM

## 2022-02-14 DIAGNOSIS — F41.9 ANXIETY: ICD-10-CM

## 2022-02-14 DIAGNOSIS — E78.5 HYPERLIPIDEMIA, UNSPECIFIED HYPERLIPIDEMIA TYPE: Primary | ICD-10-CM

## 2022-02-14 LAB
ALBUMIN SERPL-MCNC: 4.6 G/DL (ref 3.5–5.2)
ALP BLD-CCNC: 56 U/L (ref 40–129)
ALT SERPL-CCNC: 28 U/L (ref 0–40)
ANION GAP SERPL CALCULATED.3IONS-SCNC: 13 MMOL/L (ref 7–16)
AST SERPL-CCNC: 23 U/L (ref 0–39)
BASOPHILS ABSOLUTE: 0.02 E9/L (ref 0–0.2)
BASOPHILS RELATIVE PERCENT: 0.3 % (ref 0–2)
BILIRUB SERPL-MCNC: 0.3 MG/DL (ref 0–1.2)
BUN BLDV-MCNC: 14 MG/DL (ref 6–20)
CALCIUM SERPL-MCNC: 9.5 MG/DL (ref 8.6–10.2)
CHLORIDE BLD-SCNC: 107 MMOL/L (ref 98–107)
CO2: 20 MMOL/L (ref 22–29)
CREAT SERPL-MCNC: 0.8 MG/DL (ref 0.7–1.2)
EOSINOPHILS ABSOLUTE: 0.15 E9/L (ref 0.05–0.5)
EOSINOPHILS RELATIVE PERCENT: 2.1 % (ref 0–6)
GFR AFRICAN AMERICAN: >60
GFR NON-AFRICAN AMERICAN: >60 ML/MIN/1.73
GLUCOSE BLD-MCNC: 103 MG/DL (ref 74–99)
HCT VFR BLD CALC: 44.5 % (ref 37–54)
HEMOGLOBIN: 14.6 G/DL (ref 12.5–16.5)
IMMATURE GRANULOCYTES #: 0.01 E9/L
IMMATURE GRANULOCYTES %: 0.1 % (ref 0–5)
LYMPHOCYTES ABSOLUTE: 3.1 E9/L (ref 1.5–4)
LYMPHOCYTES RELATIVE PERCENT: 43.1 % (ref 20–42)
MCH RBC QN AUTO: 30.4 PG (ref 26–35)
MCHC RBC AUTO-ENTMCNC: 32.8 % (ref 32–34.5)
MCV RBC AUTO: 92.7 FL (ref 80–99.9)
MONOCYTES ABSOLUTE: 0.57 E9/L (ref 0.1–0.95)
MONOCYTES RELATIVE PERCENT: 7.9 % (ref 2–12)
NEUTROPHILS ABSOLUTE: 3.35 E9/L (ref 1.8–7.3)
NEUTROPHILS RELATIVE PERCENT: 46.5 % (ref 43–80)
PDW BLD-RTO: 12 FL (ref 11.5–15)
PLATELET # BLD: 269 E9/L (ref 130–450)
PMV BLD AUTO: 10.9 FL (ref 7–12)
POTASSIUM SERPL-SCNC: 4.1 MMOL/L (ref 3.5–5)
RBC # BLD: 4.8 E12/L (ref 3.8–5.8)
SODIUM BLD-SCNC: 140 MMOL/L (ref 132–146)
TOTAL PROTEIN: 7.3 G/DL (ref 6.4–8.3)
TSH SERPL DL<=0.05 MIU/L-ACNC: 1.24 UIU/ML (ref 0.27–4.2)
WBC # BLD: 7.2 E9/L (ref 4.5–11.5)

## 2022-02-14 PROCEDURE — 4004F PT TOBACCO SCREEN RCVD TLK: CPT | Performed by: FAMILY MEDICINE

## 2022-02-14 PROCEDURE — G8484 FLU IMMUNIZE NO ADMIN: HCPCS | Performed by: FAMILY MEDICINE

## 2022-02-14 PROCEDURE — G8417 CALC BMI ABV UP PARAM F/U: HCPCS | Performed by: FAMILY MEDICINE

## 2022-02-14 PROCEDURE — G8427 DOCREV CUR MEDS BY ELIG CLIN: HCPCS | Performed by: FAMILY MEDICINE

## 2022-02-14 PROCEDURE — 99214 OFFICE O/P EST MOD 30 MIN: CPT | Performed by: FAMILY MEDICINE

## 2022-02-14 RX ORDER — BUSPIRONE HYDROCHLORIDE 10 MG/1
10 TABLET ORAL 2 TIMES DAILY
Qty: 60 TABLET | Refills: 1 | Status: SHIPPED | OUTPATIENT
Start: 2022-02-14 | End: 2022-03-16

## 2022-02-14 ASSESSMENT — PATIENT HEALTH QUESTIONNAIRE - PHQ9
9. THOUGHTS THAT YOU WOULD BE BETTER OFF DEAD, OR OF HURTING YOURSELF: 0
1. LITTLE INTEREST OR PLEASURE IN DOING THINGS: 0
SUM OF ALL RESPONSES TO PHQ QUESTIONS 1-9: 0
6. FEELING BAD ABOUT YOURSELF - OR THAT YOU ARE A FAILURE OR HAVE LET YOURSELF OR YOUR FAMILY DOWN: 0
10. IF YOU CHECKED OFF ANY PROBLEMS, HOW DIFFICULT HAVE THESE PROBLEMS MADE IT FOR YOU TO DO YOUR WORK, TAKE CARE OF THINGS AT HOME, OR GET ALONG WITH OTHER PEOPLE: 0
SUM OF ALL RESPONSES TO PHQ QUESTIONS 1-9: 0
SUM OF ALL RESPONSES TO PHQ QUESTIONS 1-9: 0
7. TROUBLE CONCENTRATING ON THINGS, SUCH AS READING THE NEWSPAPER OR WATCHING TELEVISION: 0
8. MOVING OR SPEAKING SO SLOWLY THAT OTHER PEOPLE COULD HAVE NOTICED. OR THE OPPOSITE, BEING SO FIGETY OR RESTLESS THAT YOU HAVE BEEN MOVING AROUND A LOT MORE THAN USUAL: 0
SUM OF ALL RESPONSES TO PHQ9 QUESTIONS 1 & 2: 0
SUM OF ALL RESPONSES TO PHQ QUESTIONS 1-9: 0
4. FEELING TIRED OR HAVING LITTLE ENERGY: 0
3. TROUBLE FALLING OR STAYING ASLEEP: 0
5. POOR APPETITE OR OVEREATING: 0
2. FEELING DOWN, DEPRESSED OR HOPELESS: 0

## 2022-02-14 ASSESSMENT — ENCOUNTER SYMPTOMS
PHOTOPHOBIA: 0
CHEST TIGHTNESS: 0
SORE THROAT: 0
COUGH: 0
APNEA: 0
SHORTNESS OF BREATH: 0
SINUS PRESSURE: 0
VOMITING: 0
WHEEZING: 0
FACIAL SWELLING: 0
NAUSEA: 0
COLOR CHANGE: 0
BLOOD IN STOOL: 0
ALLERGIC/IMMUNOLOGIC NEGATIVE: 1
DIARRHEA: 0
CONSTIPATION: 1
BACK PAIN: 0
ABDOMINAL PAIN: 0

## 2022-02-14 NOTE — PROGRESS NOTES
Chief Complaint:   Chief Complaint   Patient presents with    Insomnia    Fatigue    Bloated    Constipation    Dizziness     for last few weeks. Fatigue  This is a recurrent problem. The problem occurs daily. The problem has been waxing and waning. Associated symptoms include fatigue. Pertinent negatives include no abdominal pain, arthralgias, chest pain, congestion, coughing, headaches, joint swelling, myalgias, nausea, rash, sore throat, vomiting or weakness. Constipation  This is a new problem. The current episode started in the past 7 days. His stool frequency is 2 to 3 times per week. The patient is not on a high fiber diet. He does not exercise regularly. There has not been adequate water intake. Pertinent negatives include no abdominal pain, back pain, diarrhea, difficulty urinating, nausea or vomiting. Dizziness  This is a new problem. The current episode started in the past 7 days. The problem occurs daily. Associated symptoms include fatigue. Pertinent negatives include no abdominal pain, arthralgias, chest pain, congestion, coughing, headaches, joint swelling, myalgias, nausea, rash, sore throat, vomiting or weakness. Mental Health Problem  Primary symptoms comment: anxiety. This is a chronic problem. The degree of incapacity that he is experiencing as a consequence of his illness is mild. Additional symptoms of the illness include fatigue. Additional symptoms of the illness do not include agitation, headaches or abdominal pain.        Patient Active Problem List   Diagnosis    Tobacco abuse    Intracranial arachnoid cyst    GERD (gastroesophageal reflux disease)    Hyperlipidemia    Epigastric pain    Symptomatic cholelithiasis       Past Medical History:   Diagnosis Date    Gallstones     for OR 4-19-19     GERD (gastroesophageal reflux disease)     Hyperlipidemia     was on medication in past, age 10 on zocor    Hypertension     never on meds, dxed by last PCP   Erika Pizarro Intracranial arachnoid cyst 05/17/2016    per CT 5/17/16-Left posterior cranial fossa- monitored by Dr. Funes Fam abuse        Past Surgical History:   Procedure Laterality Date    CHOLECYSTECTOMY, LAPAROSCOPIC N/A 4/19/2019    CHOLECYSTECTOMY LAPAROSCOPIC POSS OPEN POSS CHOLANGIOGRAM performed [facility]zer Paula DO [facility] TYMPANOSTOMY TUBE PLACEMENT      UPPER GASTROINTESTINAL ENDOSCOPY N/A 3/25/2019    EGD BIOPSY performed by Eliezer Paula DO at [facility] ENDOSCOPY       Current Outpatient Medications   Medication Sig Dispense Refill    busPIRone (BUSPAR) 10 MG tablet Take 1 tablet by mouth 2 times daily 60 tablet 1    nicotine (NICODERM CQ) 21 MG/24HR Place 1 patch onto the skin daily 42 patch 0    dicyclomine (BENTYL) 10 MG capsule Take 2 capsules by mouth 4 times daily as needed (abdominal pain and cramping) 20 capsule none    ondansetron (ZOFRAN ODT) 4 MG disintegrating tablet Take 1 tablet by mouth every 8 hours as needed for Nausea or Vomiting 10 tablet 0    albuterol sulfate HFA (VENTOLIN HFA) 108 (90 Base) MCG/ACT inhaler Inhale 2 puffs into the lungs every 6 hours as needed for Wheezing 1 Inhaler 3     No current facility-administered medications for this visit. No Known Allergies    Social History     Socioeconomic History    Marital status: Single     Spouse name: None    Number of children: None    Years of education: None    Highest education level: None   Occupational History    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[employer] [facility] in [place], [place] and [facility]   Tobacco Use    Smoking status: Current Every Day Smoker     Packs/day: 1.00     Years: 17.00     Pack years: 17.00     Types: Cigarettes     Start date: 1/1/2009    Smokeless tobacco: Never Used    Tobacco comment: started age 10 and smoked up to a pack a day   Vaping Use    Vaping Use: Never used   Substance and Sexual Activity    Alcohol use:  Yes     Alcohol/week: 0.0 standard drinks Comment: Occasionally-socially    Drug use: No    Sexual activity: Yes     Partners: Male     Comment: Identifies as carranza   Other Topics Concern    None   Social History Narrative    Lives alone     Social Determinants of Health     Financial Resource Strain:     Difficulty of Paying Living Expenses: Not on file   Food Insecurity:     Worried About Running Out of Food in the Last Year: Not on file    July of Food in the Last Year: Not on file   Transportation Needs:     Lack of Transportation (Medical): Not on file    Lack of Transportation (Non-Medical):  Not on file   Physical Activity:     Days of Exercise per Week: Not on file    Minutes of Exercise per Session: Not on file   Stress:     Feeling of Stress : Not on file   Social Connections:     Frequency of Communication with Friends and Family: Not on file    Frequency of Social Gatherings with Friends and Family: Not on file    Attends Episcopal Services: Not on file    Active Member of [address] [place] [address] or Organizations: Not on file    Attends Club or Organization Meetings: Not on file    Marital Status: Not on file   Intimate Partner Violence:     Fear of Current or Ex-Partner: Not on file    Emotionally Abused: Not on file    Physically Abused: Not on file    Sexually Abused: Not on file   Housing Stability:     Unable to Pay for Housing in the Last Year: Not on file    Number of [place] in the Last Year: Not on file    Unstable Housing in the Last Year: Not on file       Family History   Problem Relation Age of Onset    Other Father         motorcycle accident    Other Sister     ADHD Brother     Down Syndrome Maternal Aunt     Thyroid Disease Paternal Aunt     Thyroid Disease Paternal Uncle     Other Maternal Grandmother         Pulmonary Emboli    High Blood Pressure Maternal Grandmother     High Cholesterol Maternal Grandmother     COPD Maternal Grandmother     Cancer Maternal Grandfather         prostate    Other Paternal Grandmother         blood clots in legs    Seizures Paternal Grandfather     High Blood Pressure Maternal Aunt     High Blood Pressure Maternal Aunt          Review of Systems   Constitutional: Positive for fatigue. HENT: Negative for congestion, facial swelling, hearing loss, nosebleeds, sinus pressure and sore throat. Eyes: Negative for photophobia and visual disturbance. Respiratory: Negative for apnea, cough, chest tightness, shortness of breath and wheezing. Cardiovascular: Negative for chest pain, palpitations and leg swelling. Gastrointestinal: Positive for constipation. Negative for abdominal pain, blood in stool, diarrhea, nausea and vomiting. Genitourinary: Negative for difficulty urinating, frequency and urgency. Musculoskeletal: Negative for arthralgias, back pain, joint swelling and myalgias. Skin: Negative for color change and rash. Allergic/Immunologic: Negative. Neurological: Positive for dizziness. Negative for syncope, weakness, light-headedness and headaches. Hematological: Negative. Psychiatric/Behavioral: Negative for agitation, behavioral problems, confusion and self-injury. The patient is nervous/anxious. All other systems reviewed and are negative. Physical Exam  Vitals and nursing note reviewed. Constitutional:       General: He is not in acute distress. Appearance: He is well-developed. HENT:      Head: Normocephalic and atraumatic. Nose: Nose normal.   Eyes:      Conjunctiva/sclera: Conjunctivae normal.      Pupils: Pupils are equal, round, and reactive to light. Neck:      Thyroid: No thyromegaly. Vascular: No JVD. Cardiovascular:      Rate and Rhythm: Normal rate and regular rhythm. Heart sounds: Normal heart sounds. No murmur heard. No friction rub. No gallop. Pulmonary:      Effort: Pulmonary effort is normal. No respiratory distress. Breath sounds: Normal breath sounds. No wheezing.    Abdominal:      General: Bowel sounds are normal. There is no distension. Palpations: Abdomen is soft. Tenderness: There is no abdominal tenderness. There is no guarding or rebound. Musculoskeletal:         General: Normal range of motion. Cervical back: Normal range of motion and neck supple. Lymphadenopathy:      Cervical: No cervical adenopathy. Skin:     General: Skin is warm and dry. Findings: No erythema or rash. Neurological:      Mental Status: He is alert and oriented to person, place, and time. Cranial Nerves: No cranial nerve deficit. Motor: No abnormal muscle tone. Coordination: Coordination normal.      Deep Tendon Reflexes: Reflexes are normal and symmetric. Psychiatric:         Mood and Affect: Mood is anxious. Behavior: Behavior normal.         Judgment: Judgment normal.                               ASSESSMENT/PLAN:    Giorgio was seen today for insomnia, fatigue, bloated, constipation and dizziness. Diagnoses and all orders for this visit:    Hyperlipidemia, unspecified hyperlipidemia type  -     CBC Auto Differential; Future  -     Comprehensive Metabolic Panel; Future  -     TSH without Reflex; Future    Anxiety  -     busPIRone (BUSPAR) 10 MG tablet; Take 1 tablet by mouth 2 times daily  -     CBC Auto Differential; Future  -     Comprehensive Metabolic Panel; Future  -     TSH without Reflex; Future    Irritable bowel syndrome with constipation  -     CBC Auto Differential; Future  -     Comprehensive Metabolic Panel; Future  -     TSH without Reflex; Future    Dizziness  -     CBC Auto Differential; Future  -     Comprehensive Metabolic Panel; Future  -     TSH without Reflex;  Future            Antonio Domingo DO    2/14/2022  4:13 PM

## 2022-02-25 ENCOUNTER — PATIENT MESSAGE (OUTPATIENT)
Dept: PRIMARY CARE CLINIC | Age: 27
End: 2022-02-25

## 2022-02-25 DIAGNOSIS — R19.4 CHANGE IN BOWEL HABITS: Primary | ICD-10-CM

## 2022-02-25 NOTE — TELEPHONE ENCOUNTER
From: Edwina Saenz  To: Dr. Adam Oneill  Sent: 2/25/2022 8:30 AM EST  Subject: Question     My poop has been flat and its hard to put still and I always feel full and bloated could we get a test or something ran

## 2022-04-22 ENCOUNTER — OFFICE VISIT (OUTPATIENT)
Dept: PRIMARY CARE CLINIC | Age: 27
End: 2022-04-22
Payer: COMMERCIAL

## 2022-04-22 VITALS
HEART RATE: 99 BPM | RESPIRATION RATE: 18 BRPM | DIASTOLIC BLOOD PRESSURE: 82 MMHG | SYSTOLIC BLOOD PRESSURE: 124 MMHG | HEIGHT: 68 IN | TEMPERATURE: 97.8 F | OXYGEN SATURATION: 97 % | WEIGHT: 253 LBS | BODY MASS INDEX: 38.34 KG/M2

## 2022-04-22 DIAGNOSIS — K04.7 DENTAL INFECTION: Primary | ICD-10-CM

## 2022-04-22 PROCEDURE — G8417 CALC BMI ABV UP PARAM F/U: HCPCS | Performed by: FAMILY MEDICINE

## 2022-04-22 PROCEDURE — 4004F PT TOBACCO SCREEN RCVD TLK: CPT | Performed by: FAMILY MEDICINE

## 2022-04-22 PROCEDURE — G8427 DOCREV CUR MEDS BY ELIG CLIN: HCPCS | Performed by: FAMILY MEDICINE

## 2022-04-22 PROCEDURE — 99213 OFFICE O/P EST LOW 20 MIN: CPT | Performed by: FAMILY MEDICINE

## 2022-04-22 RX ORDER — CLINDAMYCIN HYDROCHLORIDE 300 MG/1
300 CAPSULE ORAL 3 TIMES DAILY
Qty: 30 CAPSULE | Refills: 0 | Status: SHIPPED | OUTPATIENT
Start: 2022-04-22 | End: 2022-05-02

## 2022-04-22 ASSESSMENT — ENCOUNTER SYMPTOMS
SHORTNESS OF BREATH: 0
FACIAL SWELLING: 0
PHOTOPHOBIA: 0
COLOR CHANGE: 0
ABDOMINAL PAIN: 0
APNEA: 0
BACK PAIN: 0
NAUSEA: 0
BLOOD IN STOOL: 0
DIARRHEA: 0
CHEST TIGHTNESS: 0
COUGH: 0
VOMITING: 0
ALLERGIC/IMMUNOLOGIC NEGATIVE: 1
WHEEZING: 0
SORE THROAT: 0
SINUS PRESSURE: 0

## 2022-04-22 NOTE — PROGRESS NOTES
Chief Complaint:   Chief Complaint   Patient presents with    Dental Pain     had wisdom teeth out wednesday. Dental Pain   This is a new problem. The current episode started in the past 7 days. The problem occurs constantly. The problem has been gradually worsening. The pain is at a severity of 3/10. The pain is moderate. Associated symptoms include difficulty swallowing. Pertinent negatives include no sinus pressure.        Patient Active Problem List   Diagnosis    Tobacco abuse    Intracranial arachnoid cyst    GERD (gastroesophageal reflux disease)    Hyperlipidemia    Epigastric pain    Symptomatic cholelithiasis       Past Medical History:   Diagnosis Date    Gallstones     for OR 4-19-19     GERD (gastroesophageal reflux disease)     Hyperlipidemia     was on medication in past, age 10 on zocor    Hypertension     never on meds, dxed by last PCP    Intracranial arachnoid cyst 05/17/2016    per CT 5/17/16-Left posterior cranial fossa- monitored by Dr. Baker Maker abuse        Past Surgical History:   Procedure Laterality Date    CHOLECYSTECTOMY, LAPAROSCOPIC N/A 4/19/2019    CHOLECYSTECTOMY LAPAROSCOPIC POSS OPEN POSS CHOLANGIOGRAM performed by Levar Alas DO at [facility] TYMPANOSTOMY TUBE PLACEMENT      UPPER GASTROINTESTINAL ENDOSCOPY N/A 3/25/2019    EGD BIOPSY performed by Levar Alas DO at [facility] ENDOSCOPY       Current Outpatient Medications   Medication Sig Dispense Refill    clindamycin (CLEOCIN) 300 MG capsule Take 1 capsule by mouth 3 times daily for 10 days 30 capsule 0    nicotine (NICODERM CQ) 21 MG/24HR Place 1 patch onto the skin daily 42 patch 0    dicyclomine (BENTYL) 10 MG capsule Take 2 capsules by mouth 4 times daily as needed (abdominal pain and cramping) 20 capsule none    ondansetron (ZOFRAN ODT) 4 MG disintegrating tablet Take 1 tablet by mouth every 8 hours as needed for Nausea or Vomiting 10 tablet 0    albuterol sulfate HFA (VENTOLIN HFA) 108 (90 Base) MCG/ACT inhaler Inhale 2 puffs into the lungs every 6 hours as needed for Wheezing 1 Inhaler 3     No current facility-administered medications for this visit. 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[place], [place] and [facility]   Tobacco Use    Smoking status: Current Every Day Smoker     Packs/day: 1.00     Years: 17.00     Pack years: 17.00     Types: Cigarettes     Start date: 1/1/2009    Smokeless tobacco: Never Used    Tobacco comment: started age 10 and smoked up to a pack a day   Vaping Use    Vaping Use: Never used   Substance and Sexual Activity    Alcohol use: Yes     Alcohol/week: 0.0 standard drinks     Comment: Occasionally-socially    Drug use: No    Sexual activity: Yes     Partners: Male     Comment: Identifies as carranza   Other Topics Concern    None   Social History Narrative    Lives alone     Social Determinants of Health     Financial Resource Strain:     Difficulty of Paying Living Expenses: Not on file   Food Insecurity:     Worried About Running Out of Food in the Last Year: Not on file    July of Food in the Last Year: Not on file   Transportation Needs:     Lack of Transportation (Medical): Not on file    Lack of Transportation (Non-Medical):  Not on file   Physical Activity:     Days of Exercise per Week: Not on file    Minutes of Exercise per Session: Not on file   Stress:     Feeling of Stress : Not on file   Social Connections:     Frequency of Communication with Friends and Family: Not on file    Frequency of Social Gatherings with Friends and Family: Not on file    Attends Shinto Services: Not on file    Active Member of Clubs or Organizations: Not on file    Attends Club or Organization Meetings: Not on file    Marital Status: Not on file   Intimate Partner Violence:     Fear of Current or Ex-Partner: Not on file    Emotionally Abused: Not on file    Physically Abused: Not on file    Sexually Abused: Not on file   Housing Stability:     Unable to Pay for Housing in the Last Year: Not on file    Number of [place] in the Last Year: Not on file    Unstable Housing in the Last Year: Not on file       Family History   Problem Relation Age of Onset    Other Father         motorcycle accident    Other Sister     ADHD Brother     Down Syndrome Maternal Aunt     Thyroid Disease Paternal Aunt     Thyroid Disease Paternal Uncle     Other Maternal Grandmother         Pulmonary Emboli    High Blood Pressure Maternal Grandmother     High Cholesterol Maternal Grandmother     COPD Maternal Grandmother     Cancer Maternal Grandfather         prostate    Other Paternal Grandmother         blood clots in legs    Seizures Paternal Grandfather     High Blood Pressure Maternal Aunt     High Blood Pressure Maternal Aunt          Review of Systems   Constitutional: Negative. HENT: Positive for dental problem. Negative for congestion, facial swelling, hearing loss, nosebleeds, sinus pressure and sore throat. Eyes: Negative for photophobia and visual disturbance. Respiratory: Negative for apnea, cough, chest tightness, shortness of breath and wheezing. Cardiovascular: Negative for chest pain, palpitations and leg swelling. Gastrointestinal: Negative for abdominal pain, blood in stool, diarrhea, nausea and vomiting. Genitourinary: Negative for difficulty urinating, frequency and urgency. Musculoskeletal: Negative for arthralgias, back pain, joint swelling and myalgias. Skin: Negative for color change and rash. Allergic/Immunologic: Negative. Neurological: Negative for syncope, weakness, light-headedness and headaches. Hematological: Negative.     Psychiatric/Behavioral: Negative for agitation, behavioral problems, confusion and self-injury. The patient is not nervous/anxious. All other systems reviewed and are negative. Physical Exam  Vitals and nursing note reviewed. Constitutional:       General: He is not in acute distress. Appearance: He is well-developed. HENT:      Head: Normocephalic and atraumatic. Nose: Nose normal.      Mouth/Throat:     Eyes:      Conjunctiva/sclera: Conjunctivae normal.      Pupils: Pupils are equal, round, and reactive to light. Neck:      Thyroid: No thyromegaly. Vascular: No JVD. Cardiovascular:      Rate and Rhythm: Normal rate and regular rhythm. Heart sounds: Normal heart sounds. No murmur heard. No friction rub. No gallop. Pulmonary:      Effort: Pulmonary effort is normal. No respiratory distress. Breath sounds: Normal breath sounds. No wheezing. Abdominal:      General: Bowel sounds are normal. There is no distension. Palpations: Abdomen is soft. Tenderness: There is no abdominal tenderness. There is no guarding or rebound. Musculoskeletal:         General: Normal range of motion. Cervical back: Normal range of motion and neck supple. Lymphadenopathy:      Cervical: No cervical adenopathy. Skin:     General: Skin is warm and dry. Findings: No erythema or rash. Neurological:      Mental Status: He is alert and oriented to person, place, and time. Cranial Nerves: No cranial nerve deficit. Motor: No abnormal muscle tone. Coordination: Coordination normal.      Deep Tendon Reflexes: Reflexes are normal and symmetric. Psychiatric:         Behavior: Behavior normal.         Judgment: Judgment normal.                               ASSESSMENT/PLAN:    Giorgio was seen today for dental pain. Diagnoses and all orders for this visit:    Dental infection  -     clindamycin (CLEOCIN) 300 MG capsule;  Take 1 capsule by mouth 3 times daily for 10 days    f/u dentist        Alberto Stewart, DO    4/22/2022  2:23 PM

## 2022-05-31 RX ORDER — BUSPIRONE HYDROCHLORIDE 10 MG/1
10 TABLET ORAL 2 TIMES DAILY
COMMUNITY
End: 2022-05-31 | Stop reason: SDUPTHER

## 2022-05-31 RX ORDER — BUSPIRONE HYDROCHLORIDE 10 MG/1
10 TABLET ORAL 2 TIMES DAILY
Qty: 60 TABLET | Refills: 0 | Status: SHIPPED | OUTPATIENT
Start: 2022-05-31

## 2022-11-21 RX ORDER — BUSPIRONE HYDROCHLORIDE 10 MG/1
10 TABLET ORAL 2 TIMES DAILY
Qty: 60 TABLET | Refills: 0 | Status: SHIPPED | OUTPATIENT
Start: 2022-11-21

## 2022-11-21 RX ORDER — BUSPIRONE HYDROCHLORIDE 10 MG/1
TABLET ORAL
Qty: 60 TABLET | Refills: 0 | Status: SHIPPED | OUTPATIENT
Start: 2022-11-21

## 2022-11-29 ENCOUNTER — OFFICE VISIT (OUTPATIENT)
Dept: FAMILY MEDICINE CLINIC | Age: 27
End: 2022-11-29
Payer: COMMERCIAL

## 2022-11-29 VITALS
TEMPERATURE: 98.2 F | DIASTOLIC BLOOD PRESSURE: 82 MMHG | WEIGHT: 258 LBS | SYSTOLIC BLOOD PRESSURE: 120 MMHG | OXYGEN SATURATION: 98 % | HEIGHT: 68 IN | BODY MASS INDEX: 39.1 KG/M2 | HEART RATE: 75 BPM | RESPIRATION RATE: 16 BRPM

## 2022-11-29 DIAGNOSIS — J01.40 ACUTE NON-RECURRENT PANSINUSITIS: Primary | ICD-10-CM

## 2022-11-29 DIAGNOSIS — R05.1 ACUTE COUGH: ICD-10-CM

## 2022-11-29 LAB
Lab: NORMAL
PERFORMING INSTRUMENT: NORMAL
QC PASS/FAIL: NORMAL
SARS-COV-2, POC: NORMAL

## 2022-11-29 PROCEDURE — G8417 CALC BMI ABV UP PARAM F/U: HCPCS | Performed by: FAMILY MEDICINE

## 2022-11-29 PROCEDURE — 99213 OFFICE O/P EST LOW 20 MIN: CPT | Performed by: FAMILY MEDICINE

## 2022-11-29 PROCEDURE — 4004F PT TOBACCO SCREEN RCVD TLK: CPT | Performed by: FAMILY MEDICINE

## 2022-11-29 PROCEDURE — G8484 FLU IMMUNIZE NO ADMIN: HCPCS | Performed by: FAMILY MEDICINE

## 2022-11-29 PROCEDURE — 87426 SARSCOV CORONAVIRUS AG IA: CPT | Performed by: FAMILY MEDICINE

## 2022-11-29 PROCEDURE — G8427 DOCREV CUR MEDS BY ELIG CLIN: HCPCS | Performed by: FAMILY MEDICINE

## 2022-11-29 RX ORDER — AZITHROMYCIN 250 MG/1
250 TABLET, FILM COATED ORAL SEE ADMIN INSTRUCTIONS
Qty: 6 TABLET | Refills: 0 | Status: SHIPPED | OUTPATIENT
Start: 2022-11-29 | End: 2022-12-04

## 2022-11-29 ASSESSMENT — ENCOUNTER SYMPTOMS
SINUS PRESSURE: 1
COUGH: 1

## 2022-11-29 NOTE — PROGRESS NOTES
22  Wanda Tani : 1995 Sex: male  Age: 32 y.o. Assessment and Plan:  Giorgio was seen today for sinusitis, otalgia and congestion. Diagnoses and all orders for this visit:    Acute non-recurrent pansinusitis  -     azithromycin (ZITHROMAX) 250 MG tablet; Take 1 tablet by mouth See Admin Instructions for 5 days 500mg on day 1 followed by 250mg on days 2 - 5    Acute cough  -     POCT COVID-19, Antigen    Rapid COVID antigen test was negative. Treat empirically for sinusitis with a course of azithromycin. Symptomatic treatment can include Tylenol, fluids, rest, Mucinex, cool mist, Claritin. If complaints do not improve, or worsen in any way, he should present back to the office. Return 3 to 5-day recheck if not improved. .    Chief Complaint   Patient presents with    Sinusitis    Otalgia    Congestion     Onset 4 days ago        Congestion, pressure, drainage, facial tenderness, mild headache,  earache, onset 5 days ago. Denies fever, chills, diaphoresis, nausea, vomiting, decreased oral intake. Denies other GI or  complaints. OTC treatments minimally effective. Review of Systems   HENT:  Positive for congestion, ear pain, postnasal drip and sinus pressure. Respiratory:  Positive for cough.         Current Outpatient Medications:     azithromycin (ZITHROMAX) 250 MG tablet, Take 1 tablet by mouth See Admin Instructions for 5 days 500mg on day 1 followed by 250mg on days 2 - 5, Disp: 6 tablet, Rfl: 0    busPIRone (BUSPAR) 10 MG tablet, Take 1 tablet by mouth twice daily, Disp: 60 tablet, Rfl: 0    busPIRone (BUSPAR) 10 MG tablet, Take 1 tablet by mouth 2 times daily (Patient not taking: Reported on 2022), Disp: 60 tablet, Rfl: 0    nicotine (NICODERM CQ) 21 MG/24HR, Place 1 patch onto the skin daily (Patient not taking: Reported on 2022), Disp: 42 patch, Rfl: 0    dicyclomine (BENTYL) 10 MG capsule, Take 2 capsules by mouth 4 times daily as needed (abdominal Not on file    Number of children: Not on file    Years of education: Not on file    Highest education level: Not on file   Occupational History    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[place], [place] and [facility]   Tobacco Use    Smoking status: Every Day     Packs/day: 1.00     Years: 17.00     Pack years: 17.00     Types: Cigarettes     Start date: 1/1/2009    Smokeless tobacco: Never    Tobacco comments:     started age 10 and smoked up to a pack a day   Vaping Use    Vaping Use: Never used   Substance and Sexual Activity    Alcohol use: Yes     Alcohol/week: 0.0 standard drinks     Comment: Occasionally-socially    Drug use: No    Sexual activity: Yes     Partners: Male     Comment: Identifies as carranza   Other Topics Concern    Not on file   Social History Narrative    Lives alone     Social Determinants of Health     Financial Resource Strain: Not on file   Food Insecurity: Not on file   Transportation Needs: Not on file   Physical Activity: Not on file   Stress: Not on file   Social Connections: Not on file   Intimate Partner Violence: Not on file   Housing Stability: Not on file       Vitals:    11/29/22 1210   BP: 120/82   Pulse: 75   Resp: 16   Temp: 98.2 °F (36.8 °C)   TempSrc: Temporal   SpO2: 98%   Weight: 258 lb (117 kg)   Height: 5' 8\" (1.727 m)       Physical Exam  Vitals and nursing note reviewed. Constitutional:       Appearance: He is well-developed. HENT:      Head: Atraumatic. Right Ear: External ear normal. There is impacted cerumen. Left Ear: External ear normal.      Nose: Congestion present. Mouth/Throat:      Pharynx: Posterior oropharyngeal erythema present. No oropharyngeal exudate. Eyes:      Conjunctiva/sclera: Conjunctivae normal.      Pupils: Pupils are equal, round, and reactive to light. Neck:      Thyroid: No thyromegaly. Trachea: No tracheal deviation. Cardiovascular:      Rate and Rhythm: Normal rate and regular rhythm. Heart sounds: No murmur heard. No friction rub. No gallop. Pulmonary:      Effort: Pulmonary effort is normal. No respiratory distress. Breath sounds: Normal breath sounds. Abdominal:      General: Bowel sounds are normal.      Palpations: Abdomen is soft. Musculoskeletal:         General: No tenderness or deformity. Normal range of motion. Cervical back: Normal range of motion and neck supple. Lymphadenopathy:      Cervical: No cervical adenopathy. Skin:     General: Skin is warm and dry. Capillary Refill: Capillary refill takes less than 2 seconds. Findings: No rash. Neurological:      Mental Status: He is alert and oriented to person, place, and time. Sensory: No sensory deficit. Motor: No abnormal muscle tone.       Coordination: Coordination normal.      Deep Tendon Reflexes: Reflexes normal.           Seen By:  Rama Silver DO

## 2023-04-18 ENCOUNTER — OFFICE VISIT (OUTPATIENT)
Dept: PRIMARY CARE CLINIC | Age: 28
End: 2023-04-18
Payer: COMMERCIAL

## 2023-04-18 VITALS
SYSTOLIC BLOOD PRESSURE: 126 MMHG | RESPIRATION RATE: 18 BRPM | DIASTOLIC BLOOD PRESSURE: 84 MMHG | TEMPERATURE: 97.8 F | OXYGEN SATURATION: 100 % | WEIGHT: 248 LBS | BODY MASS INDEX: 37.59 KG/M2 | HEART RATE: 85 BPM | HEIGHT: 68 IN

## 2023-04-18 DIAGNOSIS — K12.2 ABSCESS OF MOUTH: Primary | ICD-10-CM

## 2023-04-18 PROCEDURE — G8417 CALC BMI ABV UP PARAM F/U: HCPCS | Performed by: FAMILY MEDICINE

## 2023-04-18 PROCEDURE — 99213 OFFICE O/P EST LOW 20 MIN: CPT | Performed by: FAMILY MEDICINE

## 2023-04-18 PROCEDURE — 4004F PT TOBACCO SCREEN RCVD TLK: CPT | Performed by: FAMILY MEDICINE

## 2023-04-18 PROCEDURE — G8427 DOCREV CUR MEDS BY ELIG CLIN: HCPCS | Performed by: FAMILY MEDICINE

## 2023-04-18 RX ORDER — DOXYCYCLINE HYCLATE 100 MG
100 TABLET ORAL 2 TIMES DAILY
Qty: 20 TABLET | Refills: 0 | Status: SHIPPED | OUTPATIENT
Start: 2023-04-18 | End: 2023-04-28

## 2023-04-18 SDOH — ECONOMIC STABILITY: INCOME INSECURITY: HOW HARD IS IT FOR YOU TO PAY FOR THE VERY BASICS LIKE FOOD, HOUSING, MEDICAL CARE, AND HEATING?: NOT HARD AT ALL

## 2023-04-18 SDOH — ECONOMIC STABILITY: FOOD INSECURITY: WITHIN THE PAST 12 MONTHS, YOU WORRIED THAT YOUR FOOD WOULD RUN OUT BEFORE YOU GOT MONEY TO BUY MORE.: NEVER TRUE

## 2023-04-18 SDOH — ECONOMIC STABILITY: HOUSING INSECURITY
IN THE LAST 12 MONTHS, WAS THERE A TIME WHEN YOU DID NOT HAVE A STEADY PLACE TO SLEEP OR SLEPT IN A SHELTER (INCLUDING NOW)?: NO

## 2023-04-18 SDOH — ECONOMIC STABILITY: FOOD INSECURITY: WITHIN THE PAST 12 MONTHS, THE FOOD YOU BOUGHT JUST DIDN'T LAST AND YOU DIDN'T HAVE MONEY TO GET MORE.: NEVER TRUE

## 2023-04-18 ASSESSMENT — PATIENT HEALTH QUESTIONNAIRE - PHQ9
SUM OF ALL RESPONSES TO PHQ QUESTIONS 1-9: 0
SUM OF ALL RESPONSES TO PHQ QUESTIONS 1-9: 0
7. TROUBLE CONCENTRATING ON THINGS, SUCH AS READING THE NEWSPAPER OR WATCHING TELEVISION: 0
3. TROUBLE FALLING OR STAYING ASLEEP: 0
9. THOUGHTS THAT YOU WOULD BE BETTER OFF DEAD, OR OF HURTING YOURSELF: 0
SUM OF ALL RESPONSES TO PHQ9 QUESTIONS 1 & 2: 0
6. FEELING BAD ABOUT YOURSELF - OR THAT YOU ARE A FAILURE OR HAVE LET YOURSELF OR YOUR FAMILY DOWN: 0
SUM OF ALL RESPONSES TO PHQ QUESTIONS 1-9: 0
1. LITTLE INTEREST OR PLEASURE IN DOING THINGS: 0
5. POOR APPETITE OR OVEREATING: 0
8. MOVING OR SPEAKING SO SLOWLY THAT OTHER PEOPLE COULD HAVE NOTICED. OR THE OPPOSITE, BEING SO FIGETY OR RESTLESS THAT YOU HAVE BEEN MOVING AROUND A LOT MORE THAN USUAL: 0
4. FEELING TIRED OR HAVING LITTLE ENERGY: 0
10. IF YOU CHECKED OFF ANY PROBLEMS, HOW DIFFICULT HAVE THESE PROBLEMS MADE IT FOR YOU TO DO YOUR WORK, TAKE CARE OF THINGS AT HOME, OR GET ALONG WITH OTHER PEOPLE: 0
2. FEELING DOWN, DEPRESSED OR HOPELESS: 0
SUM OF ALL RESPONSES TO PHQ QUESTIONS 1-9: 0

## 2023-04-18 ASSESSMENT — ENCOUNTER SYMPTOMS
ABDOMINAL PAIN: 0
SINUS PRESSURE: 0
BACK PAIN: 0
WHEEZING: 0
CHEST TIGHTNESS: 0
PHOTOPHOBIA: 0
NAUSEA: 0
FACIAL SWELLING: 0
COLOR CHANGE: 0
ALLERGIC/IMMUNOLOGIC NEGATIVE: 1
BLOOD IN STOOL: 0
APNEA: 0

## 2023-10-29 ENCOUNTER — HOSPITAL ENCOUNTER (EMERGENCY)
Age: 28
Discharge: HOME | End: 2023-10-29

## 2023-10-29 ENCOUNTER — APPOINTMENT (OUTPATIENT)
Dept: CT IMAGING | Age: 28
End: 2023-10-29

## 2023-10-29 ENCOUNTER — APPOINTMENT (OUTPATIENT)
Dept: GENERAL RADIOLOGY | Age: 28
End: 2023-10-29

## 2023-10-29 VITALS
SYSTOLIC BLOOD PRESSURE: 141 MMHG | WEIGHT: 252 LBS | TEMPERATURE: 97.7 F | HEIGHT: 67 IN | DIASTOLIC BLOOD PRESSURE: 89 MMHG | OXYGEN SATURATION: 99 % | RESPIRATION RATE: 16 BRPM | BODY MASS INDEX: 39.55 KG/M2 | HEART RATE: 90 BPM

## 2023-10-29 DIAGNOSIS — R10.9 ABDOMINAL PAIN, UNSPECIFIED ABDOMINAL LOCATION: ICD-10-CM

## 2023-10-29 DIAGNOSIS — K52.9 COLITIS: Primary | ICD-10-CM

## 2023-10-29 DIAGNOSIS — R11.0 NAUSEA: ICD-10-CM

## 2023-10-29 LAB
ALBUMIN SERPL-MCNC: 4.6 G/DL (ref 3.5–5.2)
ALP SERPL-CCNC: 63 U/L (ref 40–129)
ALT SERPL-CCNC: 38 U/L (ref 0–40)
ANION GAP SERPL CALCULATED.3IONS-SCNC: 12 MMOL/L (ref 7–16)
AST SERPL-CCNC: 22 U/L (ref 0–39)
BASOPHILS # BLD: 0.02 K/UL (ref 0–0.2)
BASOPHILS NFR BLD: 0 % (ref 0–2)
BILIRUB SERPL-MCNC: 0.4 MG/DL (ref 0–1.2)
BILIRUB UR QL STRIP: NEGATIVE
BUN SERPL-MCNC: 10 MG/DL (ref 6–20)
CALCIUM SERPL-MCNC: 9.4 MG/DL (ref 8.6–10.2)
CHLORIDE SERPL-SCNC: 103 MMOL/L (ref 98–107)
CLARITY UR: CLEAR
CO2 SERPL-SCNC: 23 MMOL/L (ref 22–29)
COLOR UR: YELLOW
COMMENT: NORMAL
CREAT SERPL-MCNC: 0.9 MG/DL (ref 0.7–1.2)
EOSINOPHIL # BLD: 0.21 K/UL (ref 0.05–0.5)
EOSINOPHILS RELATIVE PERCENT: 2 % (ref 0–6)
ERYTHROCYTE [DISTWIDTH] IN BLOOD BY AUTOMATED COUNT: 11.9 % (ref 11.5–15)
GFR SERPL CREATININE-BSD FRML MDRD: >60 ML/MIN/1.73M2
GLUCOSE SERPL-MCNC: 94 MG/DL (ref 74–99)
GLUCOSE UR STRIP-MCNC: NEGATIVE MG/DL
HCT VFR BLD AUTO: 44.8 % (ref 37–54)
HGB BLD-MCNC: 15.1 G/DL (ref 12.5–16.5)
HGB UR QL STRIP.AUTO: NEGATIVE
IMM GRANULOCYTES # BLD AUTO: <0.03 K/UL (ref 0–0.58)
IMM GRANULOCYTES NFR BLD: 0 % (ref 0–5)
KETONES UR STRIP-MCNC: NEGATIVE MG/DL
LACTATE BLDV-SCNC: 0.8 MMOL/L (ref 0.5–2.2)
LEUKOCYTE ESTERASE UR QL STRIP: NEGATIVE
LIPASE SERPL-CCNC: 26 U/L (ref 13–60)
LYMPHOCYTES NFR BLD: 2.58 K/UL (ref 1.5–4)
LYMPHOCYTES RELATIVE PERCENT: 28 % (ref 20–42)
MCH RBC QN AUTO: 30.5 PG (ref 26–35)
MCHC RBC AUTO-ENTMCNC: 33.7 G/DL (ref 32–34.5)
MCV RBC AUTO: 90.5 FL (ref 80–99.9)
MONOCYTES NFR BLD: 0.62 K/UL (ref 0.1–0.95)
MONOCYTES NFR BLD: 7 % (ref 2–12)
NEUTROPHILS NFR BLD: 63 % (ref 43–80)
NEUTS SEG NFR BLD: 5.84 K/UL (ref 1.8–7.3)
NITRITE UR QL STRIP: NEGATIVE
PH UR STRIP: 5.5 [PH] (ref 5–9)
PLATELET # BLD AUTO: 284 K/UL (ref 130–450)
PMV BLD AUTO: 10.9 FL (ref 7–12)
POTASSIUM SERPL-SCNC: 3.7 MMOL/L (ref 3.5–5)
PROT SERPL-MCNC: 7.3 G/DL (ref 6.4–8.3)
PROT UR STRIP-MCNC: NEGATIVE MG/DL
RBC # BLD AUTO: 4.95 M/UL (ref 3.8–5.8)
SODIUM SERPL-SCNC: 138 MMOL/L (ref 132–146)
SP GR UR STRIP: 1.01 (ref 1–1.03)
TROPONIN I SERPL HS-MCNC: 6 NG/L (ref 0–11)
UROBILINOGEN UR STRIP-ACNC: 1 EU/DL (ref 0–1)
WBC OTHER # BLD: 9.3 K/UL (ref 4.5–11.5)

## 2023-10-29 PROCEDURE — 85025 COMPLETE CBC W/AUTO DIFF WBC: CPT

## 2023-10-29 PROCEDURE — 6370000000 HC RX 637 (ALT 250 FOR IP): Performed by: NURSE PRACTITIONER

## 2023-10-29 PROCEDURE — 83605 ASSAY OF LACTIC ACID: CPT

## 2023-10-29 PROCEDURE — 84484 ASSAY OF TROPONIN QUANT: CPT

## 2023-10-29 PROCEDURE — 2580000003 HC RX 258: Performed by: NURSE PRACTITIONER

## 2023-10-29 PROCEDURE — 6360000004 HC RX CONTRAST MEDICATION: Performed by: RADIOLOGY

## 2023-10-29 PROCEDURE — 93005 ELECTROCARDIOGRAM TRACING: CPT | Performed by: NURSE PRACTITIONER

## 2023-10-29 PROCEDURE — C9113 INJ PANTOPRAZOLE SODIUM, VIA: HCPCS | Performed by: NURSE PRACTITIONER

## 2023-10-29 PROCEDURE — 2580000003 HC RX 258: Performed by: RADIOLOGY

## 2023-10-29 PROCEDURE — 99285 EMERGENCY DEPT VISIT HI MDM: CPT

## 2023-10-29 PROCEDURE — 81003 URINALYSIS AUTO W/O SCOPE: CPT

## 2023-10-29 PROCEDURE — 6360000002 HC RX W HCPCS: Performed by: NURSE PRACTITIONER

## 2023-10-29 PROCEDURE — 71046 X-RAY EXAM CHEST 2 VIEWS: CPT

## 2023-10-29 PROCEDURE — 83690 ASSAY OF LIPASE: CPT

## 2023-10-29 PROCEDURE — 80053 COMPREHEN METABOLIC PANEL: CPT

## 2023-10-29 PROCEDURE — 96374 THER/PROPH/DIAG INJ IV PUSH: CPT

## 2023-10-29 PROCEDURE — 96375 TX/PRO/DX INJ NEW DRUG ADDON: CPT

## 2023-10-29 PROCEDURE — 74177 CT ABD & PELVIS W/CONTRAST: CPT

## 2023-10-29 RX ORDER — PANTOPRAZOLE SODIUM 40 MG/10ML
40 INJECTION, POWDER, LYOPHILIZED, FOR SOLUTION INTRAVENOUS ONCE
Status: COMPLETED | OUTPATIENT
Start: 2023-10-29 | End: 2023-10-29

## 2023-10-29 RX ORDER — DICYCLOMINE HYDROCHLORIDE 10 MG/1
10 CAPSULE ORAL 4 TIMES DAILY PRN
Qty: 12 CAPSULE | Refills: 0 | Status: SHIPPED | OUTPATIENT
Start: 2023-10-29 | End: 2023-11-01

## 2023-10-29 RX ORDER — SODIUM CHLORIDE 0.9 % (FLUSH) 0.9 %
10 SYRINGE (ML) INJECTION PRN
Status: COMPLETED | OUTPATIENT
Start: 2023-10-29 | End: 2023-10-29

## 2023-10-29 RX ORDER — AMOXICILLIN AND CLAVULANATE POTASSIUM 875; 125 MG/1; MG/1
1 TABLET, FILM COATED ORAL ONCE
Status: COMPLETED | OUTPATIENT
Start: 2023-10-29 | End: 2023-10-29

## 2023-10-29 RX ORDER — AMOXICILLIN AND CLAVULANATE POTASSIUM 875; 125 MG/1; MG/1
1 TABLET, FILM COATED ORAL 3 TIMES DAILY
Qty: 30 TABLET | Refills: 0 | Status: SHIPPED | OUTPATIENT
Start: 2023-10-29 | End: 2023-11-08

## 2023-10-29 RX ORDER — ONDANSETRON 4 MG/1
4 TABLET, FILM COATED ORAL EVERY 8 HOURS PRN
Qty: 10 TABLET | Refills: 0 | Status: SHIPPED | OUTPATIENT
Start: 2023-10-29

## 2023-10-29 RX ORDER — ONDANSETRON 2 MG/ML
4 INJECTION INTRAMUSCULAR; INTRAVENOUS ONCE
Status: COMPLETED | OUTPATIENT
Start: 2023-10-29 | End: 2023-10-29

## 2023-10-29 RX ORDER — 0.9 % SODIUM CHLORIDE 0.9 %
1000 INTRAVENOUS SOLUTION INTRAVENOUS ONCE
Status: COMPLETED | OUTPATIENT
Start: 2023-10-29 | End: 2023-10-29

## 2023-10-29 RX ADMIN — ONDANSETRON 4 MG: 2 INJECTION INTRAMUSCULAR; INTRAVENOUS at 19:26

## 2023-10-29 RX ADMIN — ALUMINUM HYDROXIDE, MAGNESIUM HYDROXIDE, AND SIMETHICONE: 200; 200; 20 SUSPENSION ORAL at 19:26

## 2023-10-29 RX ADMIN — IOPAMIDOL 75 ML: 755 INJECTION, SOLUTION INTRAVENOUS at 20:44

## 2023-10-29 RX ADMIN — SODIUM CHLORIDE, PRESERVATIVE FREE 10 ML: 5 INJECTION INTRAVENOUS at 20:42

## 2023-10-29 RX ADMIN — PANTOPRAZOLE SODIUM 40 MG: 40 INJECTION, POWDER, FOR SOLUTION INTRAVENOUS at 19:26

## 2023-10-29 RX ADMIN — AMOXICILLIN AND CLAVULANATE POTASSIUM 1 TABLET: 875; 125 TABLET, FILM COATED ORAL at 22:16

## 2023-10-29 RX ADMIN — SODIUM CHLORIDE 1000 ML: 9 INJECTION, SOLUTION INTRAVENOUS at 19:25

## 2023-10-29 ASSESSMENT — LIFESTYLE VARIABLES
HOW OFTEN DO YOU HAVE A DRINK CONTAINING ALCOHOL: MONTHLY OR LESS
HOW MANY STANDARD DRINKS CONTAINING ALCOHOL DO YOU HAVE ON A TYPICAL DAY: 1 OR 2

## 2023-10-29 ASSESSMENT — PAIN DESCRIPTION - DESCRIPTORS: DESCRIPTORS: CRAMPING;SHARP;STABBING

## 2023-10-29 ASSESSMENT — PAIN - FUNCTIONAL ASSESSMENT
PAIN_FUNCTIONAL_ASSESSMENT: PREVENTS OR INTERFERES SOME ACTIVE ACTIVITIES AND ADLS
PAIN_FUNCTIONAL_ASSESSMENT: 0-10

## 2023-10-29 ASSESSMENT — PAIN DESCRIPTION - LOCATION: LOCATION: ABDOMEN

## 2023-10-29 ASSESSMENT — PAIN SCALES - GENERAL: PAINLEVEL_OUTOF10: 9

## 2023-10-29 ASSESSMENT — PAIN DESCRIPTION - PAIN TYPE: TYPE: ACUTE PAIN

## 2023-10-29 ASSESSMENT — PAIN DESCRIPTION - ONSET: ONSET: SUDDEN

## 2023-10-29 ASSESSMENT — PAIN DESCRIPTION - FREQUENCY: FREQUENCY: INTERMITTENT

## 2023-10-29 NOTE — ED PROVIDER NOTES
discharge. Patient started semaglutide injection 6 weeks ago for weight loss from [employer] and lost 15 pounds total thus far. Differential diagnoses included but not limited to bowel obstruction, viral illness, UTI, choledocholithiasis, GI bleed, diverticulitis, pancreatitis, acute coronary event to mention a few. Workup in the ED revealed WBCs 9.3, Hgb 15.1; normal BUN/creatinine. LFTs and lipase normal.  Troponin normal.  Patient had rectal examination no hemorrhoids seen. Hemoccult negative. No rectal tenderness. Lactic acid normal.  EKG negative for acute STEMI. PERC score 0 CT of the abdomen shows fluid-filled bowel segments in the proximal colon likely to be enterocolitis without any perforation or abscess. Patient was given normal saline fluids, Zofran, GI cocktail for their symptoms with good improvement. Patient continues to be non-toxic on re-evaluation. Findings were discussed with the patient and reasons to immediately return to the ED were articulated to them. Patient also advised to make sure he takes a high-fiber diet specially while taking the weight loss medication which could cause a bowel obstruction he does not drink enough water. They will follow-up with their PMD and GI specialist for reevaluation. Patient will be given 1 dose therapy of Augmentin first dose in the ED. Patient remains afebrile nontoxic in appearance hemodynamically stable. He will be given prescription for Augmentin Bentyl and Zofran for home. History from : Patient    Limitations to history : None    Discussion with Other Professionals : None    Social Determinants Significantly Affecting Health : None           Plan of Care/Counseling:  ALEXIS Isaac CNP  reviewed today's visit with the patient in addition to providing specific details for the plan of care and counseling regarding the diagnosis and prognosis. Questions are answered at this time and are agreeable with the plan.     Assessment

## 2023-10-30 LAB
EKG ATRIAL RATE: 85 BPM
EKG P AXIS: 31 DEGREES
EKG P-R INTERVAL: 132 MS
EKG Q-T INTERVAL: 364 MS
EKG QRS DURATION: 92 MS
EKG QTC CALCULATION (BAZETT): 433 MS
EKG R AXIS: 39 DEGREES
EKG T AXIS: 17 DEGREES
EKG VENTRICULAR RATE: 85 BPM

## 2023-10-30 PROCEDURE — 93010 ELECTROCARDIOGRAM REPORT: CPT | Performed by: INTERNAL MEDICINE

## 2024-02-18 ASSESSMENT — PATIENT HEALTH QUESTIONNAIRE - PHQ9
7. TROUBLE CONCENTRATING ON THINGS, SUCH AS READING THE NEWSPAPER OR WATCHING TELEVISION: 1
SUM OF ALL RESPONSES TO PHQ QUESTIONS 1-9: 9
3. TROUBLE FALLING OR STAYING ASLEEP: MORE THAN HALF THE DAYS
4. FEELING TIRED OR HAVING LITTLE ENERGY: MORE THAN HALF THE DAYS
9. THOUGHTS THAT YOU WOULD BE BETTER OFF DEAD, OR OF HURTING YOURSELF: 0
5. POOR APPETITE OR OVEREATING: 0
6. FEELING BAD ABOUT YOURSELF - OR THAT YOU ARE A FAILURE OR HAVE LET YOURSELF OR YOUR FAMILY DOWN: 1
9. THOUGHTS THAT YOU WOULD BE BETTER OFF DEAD, OR OF HURTING YOURSELF: NOT AT ALL
1. LITTLE INTEREST OR PLEASURE IN DOING THINGS: MORE THAN HALF THE DAYS
6. FEELING BAD ABOUT YOURSELF - OR THAT YOU ARE A FAILURE OR HAVE LET YOURSELF OR YOUR FAMILY DOWN: SEVERAL DAYS
1. LITTLE INTEREST OR PLEASURE IN DOING THINGS: 2
SUM OF ALL RESPONSES TO PHQ QUESTIONS 1-9: 9
SUM OF ALL RESPONSES TO PHQ9 QUESTIONS 1 & 2: 3
SUM OF ALL RESPONSES TO PHQ QUESTIONS 1-9: 9
8. MOVING OR SPEAKING SO SLOWLY THAT OTHER PEOPLE COULD HAVE NOTICED. OR THE OPPOSITE - BEING SO FIDGETY OR RESTLESS THAT YOU HAVE BEEN MOVING AROUND A LOT MORE THAN USUAL: NOT AT ALL
7. TROUBLE CONCENTRATING ON THINGS, SUCH AS READING THE NEWSPAPER OR WATCHING TELEVISION: SEVERAL DAYS
2. FEELING DOWN, DEPRESSED OR HOPELESS: 1
SUM OF ALL RESPONSES TO PHQ QUESTIONS 1-9: 9
SUM OF ALL RESPONSES TO PHQ9 QUESTIONS 1 & 2: 3
3. TROUBLE FALLING OR STAYING ASLEEP: 2
8. MOVING OR SPEAKING SO SLOWLY THAT OTHER PEOPLE COULD HAVE NOTICED. OR THE OPPOSITE, BEING SO FIGETY OR RESTLESS THAT YOU HAVE BEEN MOVING AROUND A LOT MORE THAN USUAL: 0
2. FEELING DOWN, DEPRESSED OR HOPELESS: SEVERAL DAYS
10. IF YOU CHECKED OFF ANY PROBLEMS, HOW DIFFICULT HAVE THESE PROBLEMS MADE IT FOR YOU TO DO YOUR WORK, TAKE CARE OF THINGS AT HOME, OR GET ALONG WITH OTHER PEOPLE: VERY DIFFICULT
5. POOR APPETITE OR OVEREATING: NOT AT ALL
10. IF YOU CHECKED OFF ANY PROBLEMS, HOW DIFFICULT HAVE THESE PROBLEMS MADE IT FOR YOU TO DO YOUR WORK, TAKE CARE OF THINGS AT HOME, OR GET ALONG WITH OTHER PEOPLE: 2
4. FEELING TIRED OR HAVING LITTLE ENERGY: 2
SUM OF ALL RESPONSES TO PHQ QUESTIONS 1-9: 9

## 2024-02-19 ENCOUNTER — OFFICE VISIT (OUTPATIENT)
Dept: PRIMARY CARE CLINIC | Age: 29
End: 2024-02-19

## 2024-02-19 VITALS
TEMPERATURE: 97.8 F | BODY MASS INDEX: 39.86 KG/M2 | HEART RATE: 86 BPM | HEIGHT: 68 IN | OXYGEN SATURATION: 97 % | SYSTOLIC BLOOD PRESSURE: 128 MMHG | DIASTOLIC BLOOD PRESSURE: 86 MMHG | RESPIRATION RATE: 18 BRPM | WEIGHT: 263 LBS

## 2024-02-19 DIAGNOSIS — F41.8 ANXIETY WITH DEPRESSION: ICD-10-CM

## 2024-02-19 DIAGNOSIS — Z00.01 ENCOUNTER FOR WELL ADULT EXAM WITH ABNORMAL FINDINGS: Primary | ICD-10-CM

## 2024-02-19 PROCEDURE — 99395 PREV VISIT EST AGE 18-39: CPT | Performed by: FAMILY MEDICINE

## 2024-02-19 ASSESSMENT — PATIENT HEALTH QUESTIONNAIRE - PHQ9
1. LITTLE INTEREST OR PLEASURE IN DOING THINGS: 2
9. THOUGHTS THAT YOU WOULD BE BETTER OFF DEAD, OR OF HURTING YOURSELF: 0
SUM OF ALL RESPONSES TO PHQ QUESTIONS 1-9: 13
2. FEELING DOWN, DEPRESSED OR HOPELESS: 2
SUM OF ALL RESPONSES TO PHQ9 QUESTIONS 1 & 2: 4
10. IF YOU CHECKED OFF ANY PROBLEMS, HOW DIFFICULT HAVE THESE PROBLEMS MADE IT FOR YOU TO DO YOUR WORK, TAKE CARE OF THINGS AT HOME, OR GET ALONG WITH OTHER PEOPLE: 2
4. FEELING TIRED OR HAVING LITTLE ENERGY: 3
7. TROUBLE CONCENTRATING ON THINGS, SUCH AS READING THE NEWSPAPER OR WATCHING TELEVISION: 2
5. POOR APPETITE OR OVEREATING: 1
8. MOVING OR SPEAKING SO SLOWLY THAT OTHER PEOPLE COULD HAVE NOTICED. OR THE OPPOSITE, BEING SO FIGETY OR RESTLESS THAT YOU HAVE BEEN MOVING AROUND A LOT MORE THAN USUAL: 1
SUM OF ALL RESPONSES TO PHQ QUESTIONS 1-9: 13
3. TROUBLE FALLING OR STAYING ASLEEP: 1
6. FEELING BAD ABOUT YOURSELF - OR THAT YOU ARE A FAILURE OR HAVE LET YOURSELF OR YOUR FAMILY DOWN: 1

## 2024-02-19 ASSESSMENT — ENCOUNTER SYMPTOMS
DIARRHEA: 0
COUGH: 0
NAUSEA: 0
COLOR CHANGE: 0
PHOTOPHOBIA: 0
SINUS PRESSURE: 0
SHORTNESS OF BREATH: 0
BACK PAIN: 0
WHEEZING: 0
VOMITING: 0
BLOOD IN STOOL: 0
ABDOMINAL PAIN: 0
CHEST TIGHTNESS: 0
ALLERGIC/IMMUNOLOGIC NEGATIVE: 1
APNEA: 0
FACIAL SWELLING: 0
SORE THROAT: 0

## 2024-02-19 ASSESSMENT — COLUMBIA-SUICIDE SEVERITY RATING SCALE - C-SSRS
1. WITHIN THE PAST MONTH, HAVE YOU WISHED YOU WERE DEAD OR WISHED YOU COULD GO TO SLEEP AND NOT WAKE UP?: NO
2. HAVE YOU ACTUALLY HAD ANY THOUGHTS OF KILLING YOURSELF?: NO
6. HAVE YOU EVER DONE ANYTHING, STARTED TO DO ANYTHING, OR PREPARED TO DO ANYTHING TO END YOUR LIFE?: NO

## 2024-02-19 NOTE — PROGRESS NOTES
Grandmother         Pulmonary Emboli    High Blood Pressure Maternal Grandmother     High Cholesterol Maternal Grandmother     COPD Maternal Grandmother     Cancer Maternal Grandfather         prostate    Other Paternal Grandmother         blood clots in legs    Seizures Paternal Grandfather     High Blood Pressure Maternal Aunt     High Blood Pressure Maternal Aunt          Review of Systems   Constitutional: Negative.    HENT:  Negative for congestion, facial swelling, hearing loss, nosebleeds, sinus pressure and sore throat.    Eyes:  Negative for photophobia and visual disturbance.   Respiratory:  Negative for apnea, cough, chest tightness, shortness of breath and wheezing.    Cardiovascular:  Negative for chest pain, palpitations and leg swelling.   Gastrointestinal:  Negative for abdominal pain, blood in stool, diarrhea, nausea and vomiting.   Genitourinary:  Negative for difficulty urinating, frequency and urgency.   Musculoskeletal:  Negative for arthralgias, back pain, joint swelling and myalgias.   Skin:  Negative for color change and rash.   Allergic/Immunologic: Negative.    Neurological:  Negative for syncope, weakness, light-headedness and headaches.   Hematological: Negative.    Psychiatric/Behavioral:  Positive for depression and dysphoric mood. Negative for agitation, behavioral problems, confusion and self-injury. The patient is nervous/anxious.    All other systems reviewed and are negative.      Physical Exam  Vitals and nursing note reviewed.   Constitutional:       General: He is not in acute distress.     Appearance: He is well-developed.   HENT:      Head: Normocephalic and atraumatic.      Nose: Nose normal.   Eyes:      Conjunctiva/sclera: Conjunctivae normal.      Pupils: Pupils are equal, round, and reactive to light.   Neck:      Thyroid: No thyromegaly.      Vascular: No JVD.   Cardiovascular:      Rate and Rhythm: Normal rate and regular rhythm.      Heart sounds: Normal heart sounds.

## 2024-03-03 ENCOUNTER — PATIENT MESSAGE (OUTPATIENT)
Dept: PRIMARY CARE CLINIC | Age: 29
End: 2024-03-03

## 2024-03-03 DIAGNOSIS — R19.4 CHANGE IN BOWEL HABITS: Primary | ICD-10-CM

## 2024-03-04 NOTE — TELEPHONE ENCOUNTER
From: Giorgio Dash  To: Dr. Percy Briseno  Sent: 3/3/2024 4:13 AM EST  Subject: Bowel issue     I have a question my bowel movements are either flat or liquid diarrhea I forgot to mention this when I was there. My stomach also continuously hurting like right at the bottom and I was talking to someone and they suggested that I see a gastrologist And or a general surgeon about having a colonoscopy what are your suggestions?

## 2024-03-22 ENCOUNTER — OFFICE VISIT (OUTPATIENT)
Dept: SURGERY | Age: 29
End: 2024-03-22

## 2024-03-22 VITALS
DIASTOLIC BLOOD PRESSURE: 85 MMHG | SYSTOLIC BLOOD PRESSURE: 141 MMHG | WEIGHT: 267.4 LBS | HEIGHT: 68 IN | TEMPERATURE: 98.6 F | HEART RATE: 92 BPM | BODY MASS INDEX: 40.53 KG/M2

## 2024-03-22 DIAGNOSIS — R19.4 CHANGE IN BOWEL HABITS: Primary | ICD-10-CM

## 2024-03-22 PROCEDURE — 99204 OFFICE O/P NEW MOD 45 MIN: CPT | Performed by: STUDENT IN AN ORGANIZED HEALTH CARE EDUCATION/TRAINING PROGRAM

## 2024-03-22 RX ORDER — LIDOCAINE 50 MG/G
OINTMENT TOPICAL
Qty: 50 G | Refills: 0 | Status: SHIPPED | OUTPATIENT
Start: 2024-03-22

## 2024-03-22 RX ORDER — SODIUM CHLORIDE 9 MG/ML
25 INJECTION, SOLUTION INTRAVENOUS PRN
OUTPATIENT
Start: 2024-03-22

## 2024-03-22 RX ORDER — SODIUM CHLORIDE 0.9 % (FLUSH) 0.9 %
5-40 SYRINGE (ML) INJECTION PRN
OUTPATIENT
Start: 2024-03-22

## 2024-03-22 RX ORDER — SODIUM CHLORIDE 0.9 % (FLUSH) 0.9 %
5-40 SYRINGE (ML) INJECTION EVERY 12 HOURS SCHEDULED
OUTPATIENT
Start: 2024-03-22

## 2024-03-22 RX ORDER — SODIUM CHLORIDE, SODIUM LACTATE, POTASSIUM CHLORIDE, CALCIUM CHLORIDE 600; 310; 30; 20 MG/100ML; MG/100ML; MG/100ML; MG/100ML
INJECTION, SOLUTION INTRAVENOUS CONTINUOUS
OUTPATIENT
Start: 2024-03-22

## 2024-03-22 ASSESSMENT — ENCOUNTER SYMPTOMS
WHEEZING: 0
TROUBLE SWALLOWING: 0
RECTAL PAIN: 1
BLOOD IN STOOL: 0
ABDOMINAL DISTENTION: 0
CONSTIPATION: 1
COUGH: 0
NAUSEA: 0
APNEA: 0
STRIDOR: 0
ANAL BLEEDING: 0
COLOR CHANGE: 0
DIARRHEA: 0
VOMITING: 0
SHORTNESS OF BREATH: 0
CHOKING: 0

## 2024-03-22 NOTE — PROGRESS NOTES
There is no guarding or rebound.      Hernia: No hernia is present.   Musculoskeletal:         General: Normal range of motion.   Skin:     General: Skin is warm and dry.      Capillary Refill: Capillary refill takes less than 2 seconds.      Coloration: Skin is not jaundiced or pale.   Neurological:      General: No focal deficit present.      Mental Status: He is alert and oriented to person, place, and time.   Psychiatric:         Mood and Affect: Mood normal.         Behavior: Behavior normal.         CBC:   Lab Results   Component Value Date/Time    WBC 9.3 10/29/2023 07:24 PM    RBC 4.95 10/29/2023 07:24 PM    HGB 15.1 10/29/2023 07:24 PM    HCT 44.8 10/29/2023 07:24 PM    MCV 90.5 10/29/2023 07:24 PM    MCH 30.5 10/29/2023 07:24 PM    MCHC 33.7 10/29/2023 07:24 PM    RDW 11.9 10/29/2023 07:24 PM     10/29/2023 07:24 PM    MPV 10.9 10/29/2023 07:24 PM     CMP:    Lab Results   Component Value Date/Time     10/29/2023 07:24 PM    K 3.7 10/29/2023 07:24 PM    K 4.1 07/04/2021 11:30 AM     10/29/2023 07:24 PM    CO2 23 10/29/2023 07:24 PM    BUN 10 10/29/2023 07:24 PM    CREATININE 0.9 10/29/2023 07:24 PM    GFRAA >60 02/14/2022 04:25 PM    LABGLOM >60 10/29/2023 07:24 PM    GLUCOSE 94 10/29/2023 07:24 PM    PROT 7.3 10/29/2023 07:24 PM    LABALBU 4.6 10/29/2023 07:24 PM    CALCIUM 9.4 10/29/2023 07:24 PM    BILITOT 0.4 10/29/2023 07:24 PM    ALKPHOS 63 10/29/2023 07:24 PM    AST 22 10/29/2023 07:24 PM    ALT 38 10/29/2023 07:24 PM     PT/INR:    Lab Results   Component Value Date/Time    PROTIME 13.3 03/24/2013 07:30 PM    INR 1.3 03/24/2013 07:30 PM     HgBA1c:  No results found for: \"LABA1C\"  LIPASE:    Lab Results   Component Value Date/Time    LIPASE 26 10/29/2023 07:24 PM              I have examined the patient and performed the key aspects of physical exam, and reviewed the record (including laboratory findings, results, and all pertinent radiology images, which are independently

## 2024-03-26 ENCOUNTER — TELEPHONE (OUTPATIENT)
Dept: SURGERY | Age: 29
End: 2024-03-26

## 2024-03-26 NOTE — TELEPHONE ENCOUNTER
MA advised [facility] per Dr Valenzuela that 50gms will cover the patient for 30 days. Ya verbalized understanding.    Electronically signed by Bianca Irby MA on 3/26/2024 at 1:54 PM

## 2024-03-26 NOTE — TELEPHONE ENCOUNTER
Scheduled patient for Diagnostic colonoscopy on 4/25/24 @ 10:45am at [facility]. Patient needs to report at the front entrance 2 hours before the procedure, NPO after the midnight the night before the procedure. No ASA products for 5 days. Left voicemail for patient. Instruction letter mailed. Encouraged to call our office if any questions.    Electronically signed by Bianca Irby MA on 3/26/2024 at 10:33 AM

## 2024-03-26 NOTE — TELEPHONE ENCOUNTER
Call from [employer] (Ya [phone]). Insurance company requiring how many days supply the Lidocaine 5% ointment 50gm tube will cover.    Electronically signed by Bianca Irby MA on 3/26/2024 at 10:18 AM

## 2024-03-26 NOTE — TELEPHONE ENCOUNTER
Prior Authorization Form:      DEMOGRAPHICS:                     Patient Name:  Giorgio Dash  Patient :  1995            Insurance:  Payor: / No coverage found.  Insurance ID Number:    Payer/Plan Subscr  Sex Relation Sub. Ins. ID Effective Group Num         DIAGNOSIS & PROCEDURE:                       Procedure/Operation: Diagnostic colonoscopy           CPT Code: 19777    Diagnosis:  Change in bowel habits    ICD10 Code: R19.4    Location:  [place]    Surgeon:  Dr Valenzuela    SCHEDULING INFORMATION:                          Date: 24    Time: 10:45am              Anesthesia:  MAC/TIVA                                                       Status:  Outpatient        Special Comments:  N/A       Electronically signed by Bianca Irby MA on 3/26/2024 at 7:39 AM

## 2024-04-22 NOTE — PROGRESS NOTES
[facility] PRE-ADMISSION TESTING INSTRUCTIONS    The Preadmission Testing patient is instructed accordingly using the following criteria (check applicable):    ARRIVAL INSTRUCTIONS:  [x] Parking the day of Surgery is located in the Main Entrance lot.  Upon entering the door, make an immediate right to the surgery reception desk    [x] Bring photo ID and insurance card    [] Bring in a copy of Living will or Durable Power of  papers.    [x] Please be sure to arrange for a responsible adult to provide transportation to and from the hospital    [x] Please arrange for a responsible adult to be with you for the 24 hour period post procedure due to having anesthesia    [x] If you awake am of surgery not feeling well or have temperature >100 please call [phone]    GENERAL INSTRUCTIONS:    [x] may have up to 8oz of water until 4 hours prior to surgery. No gum, no candy, no mints. NPO time:0615       [x] You may brush your teeth, do not swallow any toothpaste    [x] Take medications as instructed     [x] Stop herbal supplements and vitamins 5 days prior to procedure    [x] Follow preop dosing of blood thinners per physician instructions    [] Take 1/2 dose of evening insulin, but no insulin after midnight    [] No oral diabetic medications after midnight    [] If diabetic and have low blood sugar or feel symptomatic, take 1-2oz apple juice only    [] Bring inhalers day of surgery    [] Bring urine specimen day of surgery    [x] Shower or bath with soap, lather and rinse well, AM of Surgery, no lotion, powders or creams to surgical site    [x] Follow bowel prep as instructed per surgeon    [x] No tobacco products within 24 hours of surgery     [x] No alcohol or illegal drug use, marijuana included, within 24 hours of surgery.    [x] Jewelry, body piercing's, eyeglasses, contact lenses and dentures are not permitted into surgery (bring cases)      [x] Please do not wear any nail polish,

## 2024-04-24 ENCOUNTER — ANESTHESIA EVENT (OUTPATIENT)
Dept: ENDOSCOPY | Age: 29
End: 2024-04-24

## 2024-04-24 NOTE — ANESTHESIA PRE PROCEDURE
05/17/2016    per CT 5/17/16-Left posterior cranial fossa- monitored by Dr. Lamb    • Tobacco abuse        Past Surgical History:        Procedure Laterality Date   • CHOLECYSTECTOMY, LAPAROSCOPIC N/A 4/19/2019    CHOLECYSTECTOMY LAPAROSCOPIC POSS OPEN POSS CHOLANGIOGRAM performed by Warner Medina DO at [facility] OR   • TYMPANOSTOMY TUBE PLACEMENT     • UPPER GASTROINTESTINAL ENDOSCOPY N/A 3/25/2019    EGD BIOPSY performed by Warner Medina DO at [facility] ENDOSCOPY       Social History:    Social History     Tobacco Use   • Smoking status: Every Day     Current packs/day: 1.00     Average packs/day: 1 pack/day for 17.2 years (17.2 ttl pk-yrs)     Types: Cigarettes     Start date: 1/1/2009   • Smokeless tobacco: Never   • Tobacco comments:     started age 14 and smoked up to a pack a day   Substance Use Topics   • Alcohol use: Yes     Comment: social                                Ready to quit: Not Answered  Counseling given: Not Answered  Tobacco comments: started age 14 and smoked up to a pack a day      Vital Signs (Current):   Vitals:    04/22/24 1434   Weight: 117.9 kg (260 lb)   Height: 1.727 m (5' 8\")                                              BP Readings from Last 3 Encounters:   03/22/24 (!) 141/85   02/19/24 128/86   10/29/23 (!) 141/89       NPO Status:                                                                                 BMI:   Wt Readings from Last 3 Encounters:   03/22/24 121.3 kg (267 lb 6.4 oz)   02/19/24 119.3 kg (263 lb)   10/29/23 114.3 kg (252 lb)     Body mass index is 39.53 kg/m².    CBC:   Lab Results   Component Value Date/Time    WBC 9.3 10/29/2023 07:24 PM    RBC 4.95 10/29/2023 07:24 PM    HGB 15.1 10/29/2023 07:24 PM    HCT 44.8 10/29/2023 07:24 PM    MCV 90.5 10/29/2023 07:24 PM    RDW 11.9 10/29/2023 07:24 PM     10/29/2023 07:24 PM       CMP:   Lab Results   Component Value Date/Time     10/29/2023 07:24 PM    K 3.7 10/29/2023 07:24 PM    K 4.1 07/04/2021 11:30

## 2024-04-25 ENCOUNTER — ANESTHESIA (OUTPATIENT)
Dept: ENDOSCOPY | Age: 29
End: 2024-04-25

## 2024-04-25 ENCOUNTER — HOSPITAL ENCOUNTER (OUTPATIENT)
Age: 29
Setting detail: OUTPATIENT SURGERY
Discharge: HOME | End: 2024-04-25
Attending: STUDENT IN AN ORGANIZED HEALTH CARE EDUCATION/TRAINING PROGRAM | Admitting: STUDENT IN AN ORGANIZED HEALTH CARE EDUCATION/TRAINING PROGRAM

## 2024-04-25 VITALS
OXYGEN SATURATION: 95 % | RESPIRATION RATE: 16 BRPM | TEMPERATURE: 97.6 F | HEIGHT: 68 IN | BODY MASS INDEX: 39.4 KG/M2 | HEART RATE: 69 BPM | SYSTOLIC BLOOD PRESSURE: 118 MMHG | WEIGHT: 260 LBS | DIASTOLIC BLOOD PRESSURE: 69 MMHG

## 2024-04-25 DIAGNOSIS — R19.4 CHANGE IN BOWEL HABITS: Primary | ICD-10-CM

## 2024-04-25 DIAGNOSIS — K58.1 IRRITABLE BOWEL SYNDROME WITH CONSTIPATION: ICD-10-CM

## 2024-04-25 PROCEDURE — 3609027000 HC COLONOSCOPY: Performed by: STUDENT IN AN ORGANIZED HEALTH CARE EDUCATION/TRAINING PROGRAM

## 2024-04-25 PROCEDURE — 2709999900 HC NON-CHARGEABLE SUPPLY: Performed by: STUDENT IN AN ORGANIZED HEALTH CARE EDUCATION/TRAINING PROGRAM

## 2024-04-25 PROCEDURE — 7100000010 HC PHASE II RECOVERY - FIRST 15 MIN: Performed by: STUDENT IN AN ORGANIZED HEALTH CARE EDUCATION/TRAINING PROGRAM

## 2024-04-25 PROCEDURE — 7100000011 HC PHASE II RECOVERY - ADDTL 15 MIN: Performed by: STUDENT IN AN ORGANIZED HEALTH CARE EDUCATION/TRAINING PROGRAM

## 2024-04-25 PROCEDURE — 45378 DIAGNOSTIC COLONOSCOPY: CPT | Performed by: STUDENT IN AN ORGANIZED HEALTH CARE EDUCATION/TRAINING PROGRAM

## 2024-04-25 PROCEDURE — 3700000001 HC ADD 15 MINUTES (ANESTHESIA): Performed by: STUDENT IN AN ORGANIZED HEALTH CARE EDUCATION/TRAINING PROGRAM

## 2024-04-25 PROCEDURE — 3700000000 HC ANESTHESIA ATTENDED CARE: Performed by: STUDENT IN AN ORGANIZED HEALTH CARE EDUCATION/TRAINING PROGRAM

## 2024-04-25 PROCEDURE — 2580000003 HC RX 258

## 2024-04-25 PROCEDURE — 6360000002 HC RX W HCPCS

## 2024-04-25 RX ORDER — SODIUM CHLORIDE 9 MG/ML
25 INJECTION, SOLUTION INTRAVENOUS PRN
Status: DISCONTINUED | OUTPATIENT
Start: 2024-04-25 | End: 2024-04-25 | Stop reason: HOSPADM

## 2024-04-25 RX ORDER — SODIUM CHLORIDE 0.9 % (FLUSH) 0.9 %
5-40 SYRINGE (ML) INJECTION EVERY 12 HOURS SCHEDULED
Status: DISCONTINUED | OUTPATIENT
Start: 2024-04-25 | End: 2024-04-25 | Stop reason: HOSPADM

## 2024-04-25 RX ORDER — NALOXONE HYDROCHLORIDE 0.4 MG/ML
INJECTION, SOLUTION INTRAMUSCULAR; INTRAVENOUS; SUBCUTANEOUS PRN
Status: DISCONTINUED | OUTPATIENT
Start: 2024-04-25 | End: 2024-04-25 | Stop reason: HOSPADM

## 2024-04-25 RX ORDER — SODIUM CHLORIDE 9 MG/ML
INJECTION, SOLUTION INTRAVENOUS CONTINUOUS PRN
Status: DISCONTINUED | OUTPATIENT
Start: 2024-04-25 | End: 2024-04-25 | Stop reason: SDUPTHER

## 2024-04-25 RX ORDER — FENTANYL CITRATE 50 UG/ML
25 INJECTION, SOLUTION INTRAMUSCULAR; INTRAVENOUS EVERY 5 MIN PRN
Status: DISCONTINUED | OUTPATIENT
Start: 2024-04-25 | End: 2024-04-25 | Stop reason: HOSPADM

## 2024-04-25 RX ORDER — SODIUM CHLORIDE 0.9 % (FLUSH) 0.9 %
5-40 SYRINGE (ML) INJECTION PRN
Status: DISCONTINUED | OUTPATIENT
Start: 2024-04-25 | End: 2024-04-25 | Stop reason: HOSPADM

## 2024-04-25 RX ORDER — ONDANSETRON 2 MG/ML
4 INJECTION INTRAMUSCULAR; INTRAVENOUS
Status: DISCONTINUED | OUTPATIENT
Start: 2024-04-25 | End: 2024-04-25 | Stop reason: HOSPADM

## 2024-04-25 RX ORDER — SODIUM CHLORIDE 9 MG/ML
INJECTION, SOLUTION INTRAVENOUS PRN
Status: DISCONTINUED | OUTPATIENT
Start: 2024-04-25 | End: 2024-04-25 | Stop reason: HOSPADM

## 2024-04-25 RX ORDER — PROPOFOL 10 MG/ML
INJECTION, EMULSION INTRAVENOUS PRN
Status: DISCONTINUED | OUTPATIENT
Start: 2024-04-25 | End: 2024-04-25 | Stop reason: SDUPTHER

## 2024-04-25 RX ORDER — FENTANYL CITRATE 50 UG/ML
INJECTION, SOLUTION INTRAMUSCULAR; INTRAVENOUS PRN
Status: DISCONTINUED | OUTPATIENT
Start: 2024-04-25 | End: 2024-04-25 | Stop reason: SDUPTHER

## 2024-04-25 RX ORDER — SODIUM CHLORIDE, SODIUM LACTATE, POTASSIUM CHLORIDE, CALCIUM CHLORIDE 600; 310; 30; 20 MG/100ML; MG/100ML; MG/100ML; MG/100ML
INJECTION, SOLUTION INTRAVENOUS CONTINUOUS
Status: DISCONTINUED | OUTPATIENT
Start: 2024-04-25 | End: 2024-04-25 | Stop reason: HOSPADM

## 2024-04-25 RX ORDER — CHLORDIAZEPOXIDE HYDROCHLORIDE AND CLIDINIUM BROMIDE 5; 2.5 MG/1; MG/1
1 CAPSULE ORAL
Qty: 120 CAPSULE | Refills: 3 | Status: SHIPPED | OUTPATIENT
Start: 2024-04-25

## 2024-04-25 RX ADMIN — FENTANYL CITRATE 50 MCG: 50 INJECTION, SOLUTION INTRAMUSCULAR; INTRAVENOUS at 09:50

## 2024-04-25 RX ADMIN — PROPOFOL 280 MG: 10 INJECTION, EMULSION INTRAVENOUS at 09:47

## 2024-04-25 RX ADMIN — SODIUM CHLORIDE: 9 INJECTION, SOLUTION INTRAVENOUS at 09:20

## 2024-04-25 ASSESSMENT — PAIN - FUNCTIONAL ASSESSMENT
PAIN_FUNCTIONAL_ASSESSMENT: NONE - DENIES PAIN
PAIN_FUNCTIONAL_ASSESSMENT: 0-10

## 2024-04-25 ASSESSMENT — PAIN SCALES - GENERAL: PAINLEVEL_OUTOF10: 0

## 2024-04-25 NOTE — H&P
[facility] General Surgery Clinic Note          Chief Complaint   Patient presents with    New Patient       Doesn't feel like he's cleaned out after going to bathroom         PCP: Percy Briseno DO     HPI: Giorgio Dash is a 28 y.o. male who presents in consultation for ivania chiun bowel habits. This has happened over the past couple of months. He states that he is usually on that constipated side but he has tried multiple stool softeners but has not helped him at all. He denies nausea or vomiting. He has a history of EGD and previous talita by Dr. Medina in 2019. He also complains of rectal pain. This muhammad happened over the past couple of months as well and affects intercourse with his fiance. He denies bloody or black stools.. No family history of colon cancer or IBD.     Past Medical History        Past Medical History:   Diagnosis Date    Gallstones       for OR 4-19-19     GERD (gastroesophageal reflux disease)      Hyperlipidemia       was on medication in past, age 14 on zocor    Hypertension       never on meds, dxed by last PCP    Intracranial arachnoid cyst 05/17/2016     per CT 5/17/16-Left posterior cranial fossa- monitored by Dr. Lamb     Tobacco abuse              Past Surgical History         Past Surgical History:   Procedure Laterality Date    CHOLECYSTECTOMY, LAPAROSCOPIC N/A 4/19/2019     CHOLECYSTECTOMY LAPAROSCOPIC POSS OPEN POSS CHOLANGIOGRAM performed by Warner Medina DO at [facility] OR    TYMPANOSTOMY TUBE PLACEMENT        UPPER GASTROINTESTINAL ENDOSCOPY N/A 3/25/2019     EGD BIOPSY performed by Warner Medina DO at [facility] ENDOSCOPY            Home Medications           Prior to Admission medications    Medication Sig Start Date End Date Taking? Authorizing Provider   lidocaine (XYLOCAINE) 5 % ointment Apply topically as needed. 3/22/24   Yes Santosh Valenzuela DO   sertraline (ZOLOFT) 50 MG tablet Take 1 tablet by mouth daily 2/19/24   Yes Percy Briseno DO   ondansetron (ZOFRAN) 4 MG

## 2024-04-25 NOTE — ANESTHESIA POSTPROCEDURE EVALUATION
Department of Anesthesiology  Postprocedure Note    Patient: Giorgio Dash  MRN: [id number]  YOB: 1995  Date of evaluation: 4/25/2024    Procedure Summary       Date: 04/25/24 Room / Location: [address]    Anesthesia Start: 0944 Anesthesia Stop: 1001    Procedure: COLONOSCOPY DIAGNOSTIC Diagnosis:       Change in bowel habits      (Change in bowel habits [R19.4])    Surgeons: Santosh Valenzuela DO Responsible Provider: Yehuda Rubi MD    Anesthesia Type: MAC ASA Status: 2            Anesthesia Type: No value filed.    Beata Phase I: Beata Score: 10    Beata Phase II:      Anesthesia Post Evaluation    Patient location during evaluation: PACU  Patient participation: complete - patient participated  Level of consciousness: awake and alert  Pain score: 0  Airway patency: patent  Nausea & Vomiting: no nausea and no vomiting  Cardiovascular status: blood pressure returned to baseline  Respiratory status: acceptable  Hydration status: euvolemic        No notable events documented.

## 2024-11-26 ENCOUNTER — OFFICE VISIT (OUTPATIENT)
Dept: PRIMARY CARE CLINIC | Age: 29
End: 2024-11-26
Payer: COMMERCIAL

## 2024-11-26 VITALS
OXYGEN SATURATION: 97 % | HEART RATE: 97 BPM | RESPIRATION RATE: 18 BRPM | BODY MASS INDEX: 43.04 KG/M2 | WEIGHT: 284 LBS | SYSTOLIC BLOOD PRESSURE: 130 MMHG | TEMPERATURE: 97.8 F | DIASTOLIC BLOOD PRESSURE: 76 MMHG | HEIGHT: 68 IN

## 2024-11-26 DIAGNOSIS — K58.1 IRRITABLE BOWEL SYNDROME WITH CONSTIPATION: ICD-10-CM

## 2024-11-26 DIAGNOSIS — R19.4 CHANGE IN BOWEL HABITS: ICD-10-CM

## 2024-11-26 PROCEDURE — G8417 CALC BMI ABV UP PARAM F/U: HCPCS | Performed by: FAMILY MEDICINE

## 2024-11-26 PROCEDURE — 4004F PT TOBACCO SCREEN RCVD TLK: CPT | Performed by: FAMILY MEDICINE

## 2024-11-26 PROCEDURE — G8484 FLU IMMUNIZE NO ADMIN: HCPCS | Performed by: FAMILY MEDICINE

## 2024-11-26 PROCEDURE — 99213 OFFICE O/P EST LOW 20 MIN: CPT | Performed by: FAMILY MEDICINE

## 2024-11-26 PROCEDURE — G8427 DOCREV CUR MEDS BY ELIG CLIN: HCPCS | Performed by: FAMILY MEDICINE

## 2024-11-26 RX ORDER — CHLORDIAZEPOXIDE HYDROCHLORIDE AND CLIDINIUM BROMIDE 5; 2.5 MG/1; MG/1
1 CAPSULE ORAL
Qty: 120 CAPSULE | Refills: 3 | Status: SHIPPED | OUTPATIENT
Start: 2024-11-26

## 2024-11-26 SDOH — ECONOMIC STABILITY: FOOD INSECURITY: WITHIN THE PAST 12 MONTHS, THE FOOD YOU BOUGHT JUST DIDN'T LAST AND YOU DIDN'T HAVE MONEY TO GET MORE.: NEVER TRUE

## 2024-11-26 SDOH — ECONOMIC STABILITY: FOOD INSECURITY: WITHIN THE PAST 12 MONTHS, YOU WORRIED THAT YOUR FOOD WOULD RUN OUT BEFORE YOU GOT MONEY TO BUY MORE.: NEVER TRUE

## 2024-11-26 SDOH — ECONOMIC STABILITY: INCOME INSECURITY: HOW HARD IS IT FOR YOU TO PAY FOR THE VERY BASICS LIKE FOOD, HOUSING, MEDICAL CARE, AND HEATING?: NOT HARD AT ALL

## 2024-11-26 ASSESSMENT — ENCOUNTER SYMPTOMS
ALLERGIC/IMMUNOLOGIC NEGATIVE: 1
ABDOMINAL PAIN: 1
PHOTOPHOBIA: 0
SINUS PRESSURE: 0
FACIAL SWELLING: 0
SHORTNESS OF BREATH: 0
BACK PAIN: 0
DIARRHEA: 0
VOMITING: 0
CHEST TIGHTNESS: 0
WHEEZING: 0
NAUSEA: 0
COLOR CHANGE: 0
SORE THROAT: 0
COUGH: 0
BLOOD IN STOOL: 0
APNEA: 0

## 2024-11-26 NOTE — PROGRESS NOTES
There is no distension.      Palpations: Abdomen is soft.      Tenderness: There is no abdominal tenderness. There is no guarding or rebound.   Musculoskeletal:         General: Normal range of motion.      Cervical back: Normal range of motion and neck supple.   Lymphadenopathy:      Cervical: No cervical adenopathy.   Skin:     General: Skin is warm and dry.      Findings: No erythema or rash.   Neurological:      Mental Status: He is alert and oriented to person, place, and time.      Cranial Nerves: No cranial nerve deficit.      Motor: No abnormal muscle tone.      Coordination: Coordination normal.      Deep Tendon Reflexes: Reflexes are normal and symmetric.   Psychiatric:         Behavior: Behavior normal.         Judgment: Judgment normal.                               ASSESSMENT/PLAN:    Giorgio \"Cong\" was seen today for discuss medications.    Diagnoses and all orders for this visit:    Change in bowel habits  -     chlordiazePOXIDE-clidinium (LIBRAX) 5-2.5 MG per capsule; Take 1 capsule by mouth 4 times daily (before meals and nightly). Max Daily Amount: 4 capsules    Irritable bowel syndrome with constipation  -     chlordiazePOXIDE-clidinium (LIBRAX) 5-2.5 MG per capsule; Take 1 capsule by mouth 4 times daily (before meals and nightly). Max Daily Amount: 4 capsules            Percy Briseno DO    11/26/2024  11:47 AM

## 2025-02-18 DIAGNOSIS — F41.8 ANXIETY WITH DEPRESSION: ICD-10-CM

## 2025-03-13 DIAGNOSIS — F41.8 ANXIETY WITH DEPRESSION: ICD-10-CM

## 2025-04-28 DIAGNOSIS — F41.8 ANXIETY WITH DEPRESSION: ICD-10-CM

## 2025-06-09 DIAGNOSIS — K58.1 IRRITABLE BOWEL SYNDROME WITH CONSTIPATION: ICD-10-CM

## 2025-06-09 DIAGNOSIS — F41.8 ANXIETY WITH DEPRESSION: ICD-10-CM

## 2025-06-09 DIAGNOSIS — R19.4 CHANGE IN BOWEL HABITS: ICD-10-CM

## 2025-06-09 RX ORDER — CHLORDIAZEPOXIDE HYDROCHLORIDE AND CLIDINIUM BROMIDE 5; 2.5 MG/1; MG/1
CAPSULE ORAL
Qty: 120 CAPSULE | Refills: 0 | OUTPATIENT
Start: 2025-06-09

## 2025-06-10 DIAGNOSIS — R19.4 CHANGE IN BOWEL HABITS: ICD-10-CM

## 2025-06-10 DIAGNOSIS — K58.1 IRRITABLE BOWEL SYNDROME WITH CONSTIPATION: ICD-10-CM

## 2025-06-10 RX ORDER — CHLORDIAZEPOXIDE HYDROCHLORIDE AND CLIDINIUM BROMIDE 5; 2.5 MG/1; MG/1
CAPSULE ORAL
Qty: 120 CAPSULE | Refills: 0 | OUTPATIENT
Start: 2025-06-10

## 2025-09-06 ENCOUNTER — HOSPITAL ENCOUNTER (EMERGENCY)
Age: 30
Discharge: HOME | End: 2025-09-06
Attending: STUDENT IN AN ORGANIZED HEALTH CARE EDUCATION/TRAINING PROGRAM
Payer: COMMERCIAL

## 2025-09-06 ENCOUNTER — APPOINTMENT (OUTPATIENT)
Dept: GENERAL RADIOLOGY | Age: 30
End: 2025-09-06
Payer: COMMERCIAL

## 2025-09-06 VITALS
DIASTOLIC BLOOD PRESSURE: 82 MMHG | HEART RATE: 99 BPM | RESPIRATION RATE: 14 BRPM | TEMPERATURE: 98.2 F | SYSTOLIC BLOOD PRESSURE: 133 MMHG | OXYGEN SATURATION: 98 %

## 2025-09-06 DIAGNOSIS — R07.9 CHEST PAIN, UNSPECIFIED TYPE: Primary | ICD-10-CM

## 2025-09-06 LAB
ALBUMIN SERPL-MCNC: 4.3 G/DL (ref 3.5–5.2)
ALP SERPL-CCNC: 59 U/L (ref 40–129)
ALT SERPL-CCNC: 85 U/L (ref 0–50)
ANION GAP SERPL CALCULATED.3IONS-SCNC: 12 MMOL/L (ref 7–16)
AST SERPL-CCNC: 50 U/L (ref 0–50)
BASOPHILS # BLD: 0.04 K/UL (ref 0–0.2)
BASOPHILS NFR BLD: 1 % (ref 0–2)
BILIRUB SERPL-MCNC: 0.3 MG/DL (ref 0–1.2)
BNP SERPL-MCNC: 38 PG/ML (ref 0–125)
BUN SERPL-MCNC: 10 MG/DL (ref 6–20)
CALCIUM SERPL-MCNC: 9.4 MG/DL (ref 8.6–10)
CHLORIDE SERPL-SCNC: 104 MMOL/L (ref 98–107)
CO2 SERPL-SCNC: 24 MMOL/L (ref 22–29)
CREAT SERPL-MCNC: 0.9 MG/DL (ref 0.7–1.2)
D-DIMER QUANTITATIVE: <200 NG/ML DDU (ref 0–230)
EOSINOPHIL # BLD: 0.21 K/UL (ref 0.05–0.5)
EOSINOPHILS RELATIVE PERCENT: 3 % (ref 0–6)
ERYTHROCYTE [DISTWIDTH] IN BLOOD BY AUTOMATED COUNT: 11.9 % (ref 11.5–15)
GFR, ESTIMATED: >90 ML/MIN/1.73M2
GLUCOSE SERPL-MCNC: 107 MG/DL (ref 74–99)
HCT VFR BLD AUTO: 46 % (ref 37–54)
HGB BLD-MCNC: 15.5 G/DL (ref 12.5–16.5)
IMM GRANULOCYTES # BLD AUTO: <0.03 K/UL (ref 0–0.58)
IMM GRANULOCYTES NFR BLD: 0 % (ref 0–5)
LYMPHOCYTES NFR BLD: 2.86 K/UL (ref 1.5–4)
LYMPHOCYTES RELATIVE PERCENT: 39 % (ref 20–42)
MCH RBC QN AUTO: 31.3 PG (ref 26–35)
MCHC RBC AUTO-ENTMCNC: 33.7 G/DL (ref 32–34.5)
MCV RBC AUTO: 92.7 FL (ref 80–99.9)
MONOCYTES NFR BLD: 0.42 K/UL (ref 0.1–0.95)
MONOCYTES NFR BLD: 6 % (ref 2–12)
NEUTROPHILS NFR BLD: 52 % (ref 43–80)
NEUTS SEG NFR BLD: 3.84 K/UL (ref 1.8–7.3)
PLATELET # BLD AUTO: 325 K/UL (ref 130–450)
PMV BLD AUTO: 10.7 FL (ref 7–12)
POTASSIUM SERPL-SCNC: 4.5 MMOL/L (ref 3.5–5.1)
PROT SERPL-MCNC: 7.4 G/DL (ref 6.4–8.3)
RBC # BLD AUTO: 4.96 M/UL (ref 3.8–5.8)
SODIUM SERPL-SCNC: 140 MMOL/L (ref 136–145)
TROPONIN I SERPL HS-MCNC: <6 NG/L (ref 0–22)
TROPONIN I SERPL HS-MCNC: <6 NG/L (ref 0–22)
WBC OTHER # BLD: 7.4 K/UL (ref 4.5–11.5)

## 2025-09-06 PROCEDURE — 84484 ASSAY OF TROPONIN QUANT: CPT

## 2025-09-06 PROCEDURE — 83880 ASSAY OF NATRIURETIC PEPTIDE: CPT

## 2025-09-06 PROCEDURE — 85025 COMPLETE CBC W/AUTO DIFF WBC: CPT

## 2025-09-06 PROCEDURE — 85379 FIBRIN DEGRADATION QUANT: CPT

## 2025-09-06 PROCEDURE — 71045 X-RAY EXAM CHEST 1 VIEW: CPT

## 2025-09-06 PROCEDURE — 80053 COMPREHEN METABOLIC PANEL: CPT

## 2025-09-06 ASSESSMENT — LIFESTYLE VARIABLES
HOW OFTEN DO YOU HAVE A DRINK CONTAINING ALCOHOL: NEVER
HOW MANY STANDARD DRINKS CONTAINING ALCOHOL DO YOU HAVE ON A TYPICAL DAY: PATIENT DOES NOT DRINK

## 2025-09-07 LAB
EKG ATRIAL RATE: 89 BPM
EKG P AXIS: 32 DEGREES
EKG P-R INTERVAL: 130 MS
EKG Q-T INTERVAL: 348 MS
EKG QRS DURATION: 82 MS
EKG QTC CALCULATION (BAZETT): 423 MS
EKG R AXIS: 12 DEGREES
EKG T AXIS: 26 DEGREES
EKG VENTRICULAR RATE: 89 BPM

## (undated) DEVICE — MEDIA CONTRAST ISOVUE  300 10X50ML

## (undated) DEVICE — TROCAR ENDOSCP L100MM DIA12MM BLDELSS OBT RADLUC STBL SL

## (undated) DEVICE — TROCAR ENDOSCP L100MM DIA5MM BLDELSS STBL SL THRD OPT VW

## (undated) DEVICE — ELECTRODE ES 36CM LAP FLAT L HK COAT DISP CLEANCOAT

## (undated) DEVICE — NEEDLE CLOSURE OMNICLOSE

## (undated) DEVICE — BLOCK BITE 60FR RUBBER ADLT DENTAL

## (undated) DEVICE — GRADUATE TRIANG MEASURE 1000ML BLK PRNT

## (undated) DEVICE — DOUBLE BASIN SET: Brand: MEDLINE INDUSTRIES, INC.

## (undated) DEVICE — PUMP SUC IRR TBNG L10FT W/ HNDPC ASSEMB STRYKEFLOW 2

## (undated) DEVICE — INSUFFLATION TUBING SET WITH FILTER, FUNNEL CONNECTOR AND LUER LOCK: Brand: JOSNOE MEDICAL INC

## (undated) DEVICE — KIT,ANTI FOG,W/SPONGE & FLUID,SOFT PACK: Brand: MEDLINE

## (undated) DEVICE — PATIENT RETURN ELECTRODE, SINGLE-USE, CONTACT QUALITY MONITORING, ADULT, WITH 9FT CORD, FOR PATIENTS WEIGING OVER 33LBS. (15KG): Brand: MEGADYNE

## (undated) DEVICE — DRAPE C ARM W41XL74IN UNIV MOB W RUBBERBAND CLP

## (undated) DEVICE — TROCAR ENDOSCP L100MM DIA5MM DIL TIP STBL SL W OPTVW

## (undated) DEVICE — SPONGE GZ W4XL4IN RAYON POLY FILL CVR W/ NONWOVEN FAB

## (undated) DEVICE — SKIN AFFIX SURG ADHESIVE 72/CS 0.55ML: Brand: MEDLINE

## (undated) DEVICE — PACK PROCEDURE SURG GEN CUST

## (undated) DEVICE — DRAPE,CHEST,FENES,15X10,STERIL: Brand: MEDLINE

## (undated) DEVICE — TOWEL,OR,DSP,ST,BLUE,STD,6/PK,12PK/CS: Brand: MEDLINE

## (undated) DEVICE — STANDARD HYPODERMIC NEEDLE,ALUMINUM HUB: Brand: MONOJECT

## (undated) DEVICE — 4-PORT MANIFOLD: Brand: NEPTUNE 2

## (undated) DEVICE — E-Z CLEAN, NON-STICK, PTFE COATED, ELECTROSURGICAL BLADE ELECTRODE, MODIFIED EXTENDED INSULATION, 2.5 INCH (6.35 CM): Brand: MEGADYNE

## (undated) DEVICE — PLUMEPORT LAPAROSCOPIC SMOKE FILTRATION DEVICE: Brand: PLUMEPORT ACTIV

## (undated) DEVICE — NEEDLE INSUF L120MM DIA2MM DISP FOR PNEUMOPERI ENDOPATH

## (undated) DEVICE — SOLUTION IRRIG 2000ML 0.9% SOD CHL USP UROMATIC PLAS CONT

## (undated) DEVICE — GOWN,SIRUS,FABRNF,2XL,18/CS: Brand: MEDLINE

## (undated) DEVICE — SUTURE 2-0 STRATAFIX MONOCRYL 45CM CT-1

## (undated) DEVICE — SPONGE GZ W4XL4IN RAYON POLY CVR W/NONWOVEN FAB STRL 2/PK

## (undated) DEVICE — CATH CHOLANGIO 19GA PRE-VIEW KUMAR

## (undated) DEVICE — BAG SPEC REM 224ML W4XL6IN DIA10MM 1 HND GYN DISP ENDOPCH

## (undated) DEVICE — INTENDED FOR TISSUE SEPARATION, AND OTHER PROCEDURES THAT REQUIRE A SHARP SURGICAL BLADE TO PUNCTURE OR CUT.: Brand: BARD-PARKER ® STAINLESS STEEL BLADES

## (undated) DEVICE — FORCEPS BX OVL CUP FEN DISPOSABLE CAP L 160CM RAD JAW 4

## (undated) DEVICE — SYRINGE MED 20ML STD CLR PLAS LUERLOCK TIP N CTRL DISP

## (undated) DEVICE — APPLIER CLP M/L SHFT DIA5MM 15 LIG LIGAMAX 5

## (undated) DEVICE — SCISSORS ENDOSCP DIA5MM CRV MPLR CAUT W/ RATCH HNDL

## (undated) DEVICE — TROCAR ENDOSCP L100MM DIA5MM BLDELSS STBL SL OBT RADLUC

## (undated) DEVICE — CHLORAPREP 26ML ORANGE